# Patient Record
Sex: FEMALE | Race: ASIAN | NOT HISPANIC OR LATINO
[De-identification: names, ages, dates, MRNs, and addresses within clinical notes are randomized per-mention and may not be internally consistent; named-entity substitution may affect disease eponyms.]

---

## 2017-03-21 ENCOUNTER — APPOINTMENT (OUTPATIENT)
Dept: OPHTHALMOLOGY | Facility: CLINIC | Age: 67
End: 2017-03-21

## 2017-03-23 PROBLEM — Z00.00 ENCOUNTER FOR PREVENTIVE HEALTH EXAMINATION: Status: ACTIVE | Noted: 2017-03-23

## 2017-04-27 ENCOUNTER — APPOINTMENT (OUTPATIENT)
Dept: OPHTHALMOLOGY | Facility: CLINIC | Age: 67
End: 2017-04-27

## 2017-05-10 ENCOUNTER — RX RENEWAL (OUTPATIENT)
Age: 67
End: 2017-05-10

## 2017-05-12 ENCOUNTER — EMERGENCY (EMERGENCY)
Facility: HOSPITAL | Age: 67
LOS: 1 days | Discharge: HOME | End: 2017-05-12
Admitting: EMERGENCY MEDICINE
Payer: COMMERCIAL

## 2017-05-12 VITALS
TEMPERATURE: 98 F | SYSTOLIC BLOOD PRESSURE: 160 MMHG | RESPIRATION RATE: 16 BRPM | OXYGEN SATURATION: 99 % | DIASTOLIC BLOOD PRESSURE: 82 MMHG | HEART RATE: 79 BPM

## 2017-05-12 PROCEDURE — 73030 X-RAY EXAM OF SHOULDER: CPT | Mod: 26,LT

## 2017-05-12 PROCEDURE — 99284 EMERGENCY DEPT VISIT MOD MDM: CPT

## 2017-05-12 PROCEDURE — 73060 X-RAY EXAM OF HUMERUS: CPT | Mod: 26,LT

## 2017-05-12 PROCEDURE — 71101 X-RAY EXAM UNILAT RIBS/CHEST: CPT | Mod: 26,LT

## 2017-05-12 RX ORDER — ALBUTEROL 90 UG/1
1 AEROSOL, METERED ORAL ONCE
Qty: 0 | Refills: 0 | Status: DISCONTINUED | OUTPATIENT
Start: 2017-05-12 | End: 2017-05-16

## 2017-05-12 NOTE — ED PROVIDER NOTE - PROGRESS NOTE DETAILS
xray reviewed. No signs of pna, fracture or dislocation. Pt will be given albuterol MDI for support. Pt to follow up with ortho outpt if arm pain continues. Pt stable for discharge.

## 2017-05-12 NOTE — ED PROVIDER NOTE - MEDICAL DECISION MAKING DETAILS
66 yr old female with hx of HTN, HLD, CRF ( not on HD yet, following nephrologist Dr. Perez every 3 months) presents c/o subjective fever, chills, productive cough/ chest congestion for the last 4 days. Pt also admits to slip and fall, 4 days ago, c/o left arm/ shoulder pain with movement. mari to r/o pna vs bronchitis  xray left shoulder/ humerus: r/o fracture   pt refusing pain medications at this time.

## 2017-05-12 NOTE — ED PROVIDER NOTE - PHYSICAL EXAMINATION
spine- no bony tenderness, positive ROM   LUE: positive tenderness to mid humerus, with pain on ROM, positive radial pulses, less than 2 sec cap refill, positive 5/5 strength   Pt ambulating

## 2017-05-12 NOTE — ED PROVIDER NOTE - OBJECTIVE STATEMENT
66 yr old female with hx of HTN, HLD, CRF ( not on HD yet, following nephrologist Dr. Perez every 3 months) presents c/o subjective fever, chills, productive cough/ chest congestion for the last 4 days. Pain to left sided ribs when coughing. Pt also admits to slip and fall, 4 days ago, c/o left arm/ shoulder pain with movement. Pt admits to  having similar symptoms, and dx with bronchitis given Zithromax by pmd. Pt denies any other injury or any other symptoms. Pt denies any recent travel.

## 2017-05-18 ENCOUNTER — APPOINTMENT (OUTPATIENT)
Dept: OPHTHALMOLOGY | Facility: HOSPITAL | Age: 67
End: 2017-05-18

## 2017-05-19 ENCOUNTER — APPOINTMENT (OUTPATIENT)
Dept: OPHTHALMOLOGY | Facility: CLINIC | Age: 67
End: 2017-05-19

## 2017-05-25 ENCOUNTER — APPOINTMENT (OUTPATIENT)
Dept: OPHTHALMOLOGY | Facility: CLINIC | Age: 67
End: 2017-05-25

## 2017-06-12 ENCOUNTER — EMERGENCY (EMERGENCY)
Facility: HOSPITAL | Age: 67
LOS: 1 days | Discharge: HOME | End: 2017-06-12
Attending: EMERGENCY MEDICINE | Admitting: EMERGENCY MEDICINE
Payer: COMMERCIAL

## 2017-06-12 VITALS
TEMPERATURE: 98 F | DIASTOLIC BLOOD PRESSURE: 75 MMHG | OXYGEN SATURATION: 98 % | RESPIRATION RATE: 16 BRPM | HEART RATE: 66 BPM | SYSTOLIC BLOOD PRESSURE: 164 MMHG

## 2017-06-12 VITALS
RESPIRATION RATE: 18 BRPM | OXYGEN SATURATION: 99 % | TEMPERATURE: 98 F | DIASTOLIC BLOOD PRESSURE: 75 MMHG | HEART RATE: 59 BPM | SYSTOLIC BLOOD PRESSURE: 144 MMHG

## 2017-06-12 DIAGNOSIS — R42 DIZZINESS AND GIDDINESS: ICD-10-CM

## 2017-06-12 LAB
ALBUMIN SERPL ELPH-MCNC: 3.8 G/DL — SIGNIFICANT CHANGE UP (ref 3.3–5)
ALP SERPL-CCNC: 176 U/L — HIGH (ref 40–120)
ALT FLD-CCNC: 10 U/L — SIGNIFICANT CHANGE UP (ref 4–33)
AST SERPL-CCNC: 14 U/L — SIGNIFICANT CHANGE UP (ref 4–32)
BASOPHILS # BLD AUTO: 0.02 K/UL — SIGNIFICANT CHANGE UP (ref 0–0.2)
BASOPHILS NFR BLD AUTO: 0.3 % — SIGNIFICANT CHANGE UP (ref 0–2)
BILIRUB SERPL-MCNC: 0.2 MG/DL — SIGNIFICANT CHANGE UP (ref 0.2–1.2)
BUN SERPL-MCNC: 28 MG/DL — HIGH (ref 7–23)
CALCIUM SERPL-MCNC: 8.6 MG/DL — SIGNIFICANT CHANGE UP (ref 8.4–10.5)
CHLORIDE SERPL-SCNC: 108 MMOL/L — HIGH (ref 98–107)
CK MB BLD-MCNC: 1.14 NG/ML — SIGNIFICANT CHANGE UP (ref 1–4.7)
CK MB BLD-MCNC: SIGNIFICANT CHANGE UP (ref 0–2.5)
CK SERPL-CCNC: 49 U/L — SIGNIFICANT CHANGE UP (ref 25–170)
CO2 SERPL-SCNC: 18 MMOL/L — LOW (ref 22–31)
CREAT SERPL-MCNC: 3.3 MG/DL — HIGH (ref 0.5–1.3)
EOSINOPHIL # BLD AUTO: 0.16 K/UL — SIGNIFICANT CHANGE UP (ref 0–0.5)
EOSINOPHIL NFR BLD AUTO: 2.4 % — SIGNIFICANT CHANGE UP (ref 0–6)
GLUCOSE SERPL-MCNC: 107 MG/DL — HIGH (ref 70–99)
HCT VFR BLD CALC: 30.6 % — LOW (ref 34.5–45)
HGB BLD-MCNC: 9.4 G/DL — LOW (ref 11.5–15.5)
IMM GRANULOCYTES NFR BLD AUTO: 0.3 % — SIGNIFICANT CHANGE UP (ref 0–1.5)
LIDOCAIN IGE QN: 92 U/L — HIGH (ref 7–60)
LYMPHOCYTES # BLD AUTO: 2.95 K/UL — SIGNIFICANT CHANGE UP (ref 1–3.3)
LYMPHOCYTES # BLD AUTO: 44.6 % — HIGH (ref 13–44)
MAGNESIUM SERPL-MCNC: 2.1 MG/DL — SIGNIFICANT CHANGE UP (ref 1.6–2.6)
MCHC RBC-ENTMCNC: 26.3 PG — LOW (ref 27–34)
MCHC RBC-ENTMCNC: 30.7 % — LOW (ref 32–36)
MCV RBC AUTO: 85.5 FL — SIGNIFICANT CHANGE UP (ref 80–100)
MONOCYTES # BLD AUTO: 0.53 K/UL — SIGNIFICANT CHANGE UP (ref 0–0.9)
MONOCYTES NFR BLD AUTO: 8 % — SIGNIFICANT CHANGE UP (ref 2–14)
NEUTROPHILS # BLD AUTO: 2.93 K/UL — SIGNIFICANT CHANGE UP (ref 1.8–7.4)
NEUTROPHILS NFR BLD AUTO: 44.4 % — SIGNIFICANT CHANGE UP (ref 43–77)
PHOSPHATE SERPL-MCNC: 3.7 MG/DL — SIGNIFICANT CHANGE UP (ref 2.5–4.5)
PLATELET # BLD AUTO: 245 K/UL — SIGNIFICANT CHANGE UP (ref 150–400)
PMV BLD: 10.1 FL — SIGNIFICANT CHANGE UP (ref 7–13)
POTASSIUM SERPL-MCNC: 4 MMOL/L — SIGNIFICANT CHANGE UP (ref 3.5–5.3)
POTASSIUM SERPL-SCNC: 4 MMOL/L — SIGNIFICANT CHANGE UP (ref 3.5–5.3)
PROT SERPL-MCNC: 7.2 G/DL — SIGNIFICANT CHANGE UP (ref 6–8.3)
RBC # BLD: 3.58 M/UL — LOW (ref 3.8–5.2)
RBC # FLD: 13.2 % — SIGNIFICANT CHANGE UP (ref 10.3–14.5)
SODIUM SERPL-SCNC: 142 MMOL/L — SIGNIFICANT CHANGE UP (ref 135–145)
TROPONIN T SERPL-MCNC: < 0.06 NG/ML — SIGNIFICANT CHANGE UP (ref 0–0.06)
WBC # BLD: 6.61 K/UL — SIGNIFICANT CHANGE UP (ref 3.8–10.5)
WBC # FLD AUTO: 6.61 K/UL — SIGNIFICANT CHANGE UP (ref 3.8–10.5)

## 2017-06-12 PROCEDURE — 71020: CPT | Mod: 26

## 2017-06-12 PROCEDURE — 70450 CT HEAD/BRAIN W/O DYE: CPT | Mod: 26

## 2017-06-12 PROCEDURE — 99285 EMERGENCY DEPT VISIT HI MDM: CPT | Mod: 25

## 2017-06-12 RX ORDER — METOCLOPRAMIDE HCL 10 MG
10 TABLET ORAL ONCE
Qty: 0 | Refills: 0 | Status: COMPLETED | OUTPATIENT
Start: 2017-06-12 | End: 2017-06-12

## 2017-06-12 RX ORDER — ATENOLOL 25 MG/1
25 TABLET ORAL ONCE
Qty: 0 | Refills: 0 | Status: COMPLETED | OUTPATIENT
Start: 2017-06-12 | End: 2017-06-12

## 2017-06-12 RX ORDER — ACETAMINOPHEN 500 MG
650 TABLET ORAL ONCE
Qty: 0 | Refills: 0 | Status: COMPLETED | OUTPATIENT
Start: 2017-06-12 | End: 2017-06-12

## 2017-06-12 RX ORDER — MECLIZINE HCL 12.5 MG
25 TABLET ORAL ONCE
Qty: 0 | Refills: 0 | Status: COMPLETED | OUTPATIENT
Start: 2017-06-12 | End: 2017-06-12

## 2017-06-12 RX ORDER — SODIUM CHLORIDE 9 MG/ML
500 INJECTION INTRAMUSCULAR; INTRAVENOUS; SUBCUTANEOUS ONCE
Qty: 0 | Refills: 0 | Status: COMPLETED | OUTPATIENT
Start: 2017-06-12 | End: 2017-06-12

## 2017-06-12 RX ADMIN — Medication 10 MILLIGRAM(S): at 04:28

## 2017-06-12 RX ADMIN — Medication 25 MILLIGRAM(S): at 06:13

## 2017-06-12 RX ADMIN — SODIUM CHLORIDE 500 MILLILITER(S): 9 INJECTION INTRAMUSCULAR; INTRAVENOUS; SUBCUTANEOUS at 04:28

## 2017-06-12 RX ADMIN — ATENOLOL 25 MILLIGRAM(S): 25 TABLET ORAL at 04:38

## 2017-06-12 RX ADMIN — Medication 650 MILLIGRAM(S): at 04:38

## 2017-06-12 NOTE — ED PROVIDER NOTE - MEDICAL DECISION MAKING DETAILS
no focal cerebellar signs but still has complaint of dizziness; will get CT head and have neurology evaluate pt;

## 2017-06-12 NOTE — CONSULT NOTE ADULT - SUBJECTIVE AND OBJECTIVE BOX
Ms. Quintana is a 65YO F with chronic renal failure, HLD, and newly diagnosed HTN that is not controlled yet (on Atenolol) who presents with dizziness, N/V since waking up at 2am to use the bathroom. Her symptoms were so severe she fell onto the bed, no LOC or trauma, but called family for help. No other associated symptoms at that time except HA, 4-5/10. Pt notes the had elevated BP today to 190s SBP. Denies any visual changes, Ms. Quintana is a 67YO F with chronic renal failure, HLD, and newly diagnosed HTN that is not controlled yet (on Atenolol) who presents with dizziness, N/V since waking up at 2am to use the bathroom. Her symptoms were so severe she fell onto the bed, no LOC or trauma, but called family for help. Dizziness described as room spinning, exacerbated by standing and head movement, relieved with laying still. No other associated symptoms at that time (no focal numbness/tingling/ weakness, no CP/SOB/palpitations) except HA, 4-5/10. Pt notes the had elevated BP today to 190s SBP. Denies any visual changes, no diplopia/blurriness, no focal weakness/sensory deficit. Denies recent ear fullness/tinnitus, or URI symptoms.           PMH: chronic renal failure, HTN, HLD   PSH: none  Meds: atenolol, lipitor, amlodipine  SH: no tobacco, alcohol, illicit drug use.   NKDA                               9.4    6.61  )-----------( 245      ( 12 Jun 2017 03:50 )             30.6   06-12    142  |  108<H>  |  28<H>  ----------------------------<  107<H>  4.0   |  18<L>  |  3.30<H>    Ca    8.6      12 Jun 2017 03:50  Phos  3.7     06-12  Mg     2.1     06-12    TPro  7.2  /  Alb  3.8  /  TBili  0.2  /  DBili  x   /  AST  14  /  ALT  10  /  AlkPhos  176<H>  06-12      CT head: IMPRESSION:  No acute intracranial hemorrhage or mass effect.  Microvascular disease.

## 2017-06-12 NOTE — ED PROVIDER NOTE - OBJECTIVE STATEMENT
66yF hx CKD, newly diagnosed HTN (rx atenolol but has not started taking yet), hld p/w sudden onset nausea, vomiting, and dizziness 2 hrs ago, constant since. Symptoms associated with headache that was gradual onset, 4-5/10 severity, bilateral across head, not worst HA of life. Pt was in usual state of health today. Awoke at 2am to go to [facility] and en route back from [facility] developed symptoms suddenly, fell onto bed without loc or head trauma and called to dtr for help. No cp or sob. No fever, cough, diarrhea, or recent illness. Pt noted to have elevated bp today (190 systolic). Has never had similar symptoms in past. Denies other focal motor or sensory deficits.

## 2017-06-12 NOTE — ED ADULT TRIAGE NOTE - CHIEF COMPLAINT QUOTE
Pt brought in by [facility] c/o dizziness, nausea and vomiting. Pt was found to be hypertensive by /70. was diagnosed  a week ago with hypertension- reports she has began taking prescribed b/p meds yet.

## 2017-06-12 NOTE — CONSULT NOTE ADULT - PROBLEM SELECTOR RECOMMENDATION 9
[] symptomatic tx - IVF, Meclizine, Zofran   [] close BP management, should follow up with primary or cards   [] can f/u with neuro- [phone] for further workup and vestibular rehab if symptoms persist despite BP control

## 2017-06-12 NOTE — ED PROVIDER NOTE - NEUROLOGICAL, MLM
Alert and oriented, no focal deficits, no motor or sensory deficits. no nystagmus, normal finger to nose

## 2017-06-12 NOTE — CONSULT NOTE ADULT - ASSESSMENT
67YO F with ESRD, HTN presenting with vertiginous like symptoms in the setting of high/uncontrolled BP that appears to be peripheral in nature as exam is normal and symptoms are resolving. DDX includes BPPV vs HTN urgency.

## 2017-06-12 NOTE — ED ADULT NURSE NOTE - CHIEF COMPLAINT QUOTE
Pt brought in by [facility] c/o dizziness, nausea and vomiting. Pt was found to be hypertensive by /70. was diagnosed  a week ago with hypertension- reports she has began taking prescribed b/p meds yet.

## 2017-06-12 NOTE — ED PROVIDER NOTE - CONSTITUTIONAL, MLM
normal... Well appearing, well nourished, awake, alert, oriented to person, place, time/situation, in distress

## 2017-06-12 NOTE — ED PROVIDER NOTE - ATTENDING CONTRIBUTION TO CARE
agree with resident note  66yF hx CKD, newly diagnosed HTN who present with N/V/dizziness since waking up at approx 1 AM.  Denies focal weakness, numbness.  Denies CP/SOB.  At rest unclear if dizzy (using daughter as )    PE: uncomfortable but not toxic appearing; CTAB/L; s1 s2 no m/r/g abd soft/NT/ND ext: no edema Neuro: CNs intact 5/5 motor UE and LE; sensation intact Cerebellar: FTN wnl; no drift; REGGIE wnl

## 2017-06-16 ENCOUNTER — INPATIENT (INPATIENT)
Facility: HOSPITAL | Age: 67
LOS: 1 days | Discharge: HOME | End: 2017-06-18
Attending: HOSPITALIST | Admitting: HOSPITALIST
Payer: COMMERCIAL

## 2017-06-16 VITALS
RESPIRATION RATE: 15 BRPM | WEIGHT: 164.91 LBS | OXYGEN SATURATION: 100 % | SYSTOLIC BLOOD PRESSURE: 141 MMHG | HEART RATE: 65 BPM | TEMPERATURE: 98 F | DIASTOLIC BLOOD PRESSURE: 74 MMHG

## 2017-06-16 DIAGNOSIS — E78.5 HYPERLIPIDEMIA, UNSPECIFIED: ICD-10-CM

## 2017-06-16 DIAGNOSIS — I10 ESSENTIAL (PRIMARY) HYPERTENSION: ICD-10-CM

## 2017-06-16 DIAGNOSIS — N18.5 CHRONIC KIDNEY DISEASE, STAGE 5: ICD-10-CM

## 2017-06-16 DIAGNOSIS — R42 DIZZINESS AND GIDDINESS: ICD-10-CM

## 2017-06-16 DIAGNOSIS — Z29.9 ENCOUNTER FOR PROPHYLACTIC MEASURES, UNSPECIFIED: ICD-10-CM

## 2017-06-16 LAB
ALBUMIN SERPL ELPH-MCNC: 4.1 G/DL — SIGNIFICANT CHANGE UP (ref 3.3–5)
ALP SERPL-CCNC: 191 U/L — HIGH (ref 40–120)
ALT FLD-CCNC: 11 U/L — SIGNIFICANT CHANGE UP (ref 4–33)
AST SERPL-CCNC: 14 U/L — SIGNIFICANT CHANGE UP (ref 4–32)
BASOPHILS # BLD AUTO: 0.01 K/UL — SIGNIFICANT CHANGE UP (ref 0–0.2)
BASOPHILS NFR BLD AUTO: 0.2 % — SIGNIFICANT CHANGE UP (ref 0–2)
BILIRUB SERPL-MCNC: 0.3 MG/DL — SIGNIFICANT CHANGE UP (ref 0.2–1.2)
BUN SERPL-MCNC: 29 MG/DL — HIGH (ref 7–23)
CALCIUM SERPL-MCNC: 8.8 MG/DL — SIGNIFICANT CHANGE UP (ref 8.4–10.5)
CHLORIDE SERPL-SCNC: 106 MMOL/L — SIGNIFICANT CHANGE UP (ref 98–107)
CK MB BLD-MCNC: 1.21 NG/ML — SIGNIFICANT CHANGE UP (ref 1–4.7)
CK MB BLD-MCNC: 1.22 NG/ML — SIGNIFICANT CHANGE UP (ref 1–4.7)
CK MB BLD-MCNC: SIGNIFICANT CHANGE UP (ref 0–2.5)
CK MB BLD-MCNC: SIGNIFICANT CHANGE UP (ref 0–2.5)
CK SERPL-CCNC: 64 U/L — SIGNIFICANT CHANGE UP (ref 25–170)
CK SERPL-CCNC: 79 U/L — SIGNIFICANT CHANGE UP (ref 25–170)
CO2 SERPL-SCNC: 19 MMOL/L — LOW (ref 22–31)
CREAT SERPL-MCNC: 3.44 MG/DL — HIGH (ref 0.5–1.3)
EOSINOPHIL # BLD AUTO: 0.17 K/UL — SIGNIFICANT CHANGE UP (ref 0–0.5)
EOSINOPHIL NFR BLD AUTO: 2.8 % — SIGNIFICANT CHANGE UP (ref 0–6)
GLUCOSE SERPL-MCNC: 100 MG/DL — HIGH (ref 70–99)
HCT VFR BLD CALC: 32.8 % — LOW (ref 34.5–45)
HGB BLD-MCNC: 9.9 G/DL — LOW (ref 11.5–15.5)
IMM GRANULOCYTES NFR BLD AUTO: 0.2 % — SIGNIFICANT CHANGE UP (ref 0–1.5)
LYMPHOCYTES # BLD AUTO: 2.48 K/UL — SIGNIFICANT CHANGE UP (ref 1–3.3)
LYMPHOCYTES # BLD AUTO: 41.2 % — SIGNIFICANT CHANGE UP (ref 13–44)
MAGNESIUM SERPL-MCNC: 2.2 MG/DL — SIGNIFICANT CHANGE UP (ref 1.6–2.6)
MCHC RBC-ENTMCNC: 26.1 PG — LOW (ref 27–34)
MCHC RBC-ENTMCNC: 30.2 % — LOW (ref 32–36)
MCV RBC AUTO: 86.5 FL — SIGNIFICANT CHANGE UP (ref 80–100)
MONOCYTES # BLD AUTO: 0.5 K/UL — SIGNIFICANT CHANGE UP (ref 0–0.9)
MONOCYTES NFR BLD AUTO: 8.3 % — SIGNIFICANT CHANGE UP (ref 2–14)
NEUTROPHILS # BLD AUTO: 2.85 K/UL — SIGNIFICANT CHANGE UP (ref 1.8–7.4)
NEUTROPHILS NFR BLD AUTO: 47.3 % — SIGNIFICANT CHANGE UP (ref 43–77)
PLATELET # BLD AUTO: 265 K/UL — SIGNIFICANT CHANGE UP (ref 150–400)
PMV BLD: 10.5 FL — SIGNIFICANT CHANGE UP (ref 7–13)
POTASSIUM SERPL-MCNC: 4.8 MMOL/L — SIGNIFICANT CHANGE UP (ref 3.5–5.3)
POTASSIUM SERPL-SCNC: 4.8 MMOL/L — SIGNIFICANT CHANGE UP (ref 3.5–5.3)
PROT SERPL-MCNC: 7.9 G/DL — SIGNIFICANT CHANGE UP (ref 6–8.3)
RBC # BLD: 3.79 M/UL — LOW (ref 3.8–5.2)
RBC # FLD: 13.5 % — SIGNIFICANT CHANGE UP (ref 10.3–14.5)
SODIUM SERPL-SCNC: 139 MMOL/L — SIGNIFICANT CHANGE UP (ref 135–145)
TROPONIN T SERPL-MCNC: < 0.06 NG/ML — SIGNIFICANT CHANGE UP (ref 0–0.06)
TROPONIN T SERPL-MCNC: < 0.06 NG/ML — SIGNIFICANT CHANGE UP (ref 0–0.06)
WBC # BLD: 6.02 K/UL — SIGNIFICANT CHANGE UP (ref 3.8–10.5)
WBC # FLD AUTO: 6.02 K/UL — SIGNIFICANT CHANGE UP (ref 3.8–10.5)

## 2017-06-16 PROCEDURE — 99223 1ST HOSP IP/OBS HIGH 75: CPT

## 2017-06-16 PROCEDURE — 93010 ELECTROCARDIOGRAM REPORT: CPT

## 2017-06-16 RX ORDER — AMLODIPINE BESYLATE 2.5 MG/1
5 TABLET ORAL DAILY
Qty: 0 | Refills: 0 | Status: DISCONTINUED | OUTPATIENT
Start: 2017-06-16 | End: 2017-06-17

## 2017-06-16 RX ORDER — ATENOLOL 25 MG/1
1 TABLET ORAL
Qty: 0 | Refills: 0 | COMMUNITY

## 2017-06-16 RX ORDER — MECLIZINE HCL 12.5 MG
25 TABLET ORAL THREE TIMES A DAY
Qty: 0 | Refills: 0 | Status: DISCONTINUED | OUTPATIENT
Start: 2017-06-16 | End: 2017-06-18

## 2017-06-16 RX ORDER — ATORVASTATIN CALCIUM 80 MG/1
40 TABLET, FILM COATED ORAL AT BEDTIME
Qty: 0 | Refills: 0 | Status: DISCONTINUED | OUTPATIENT
Start: 2017-06-16 | End: 2017-06-18

## 2017-06-16 RX ORDER — HEPARIN SODIUM 5000 [USP'U]/ML
5000 INJECTION INTRAVENOUS; SUBCUTANEOUS EVERY 12 HOURS
Qty: 0 | Refills: 0 | Status: DISCONTINUED | OUTPATIENT
Start: 2017-06-16 | End: 2017-06-18

## 2017-06-16 RX ORDER — ASPIRIN/CALCIUM CARB/MAGNESIUM 324 MG
81 TABLET ORAL DAILY
Qty: 0 | Refills: 0 | Status: DISCONTINUED | OUTPATIENT
Start: 2017-06-16 | End: 2017-06-18

## 2017-06-16 RX ORDER — SODIUM BICARBONATE 1 MEQ/ML
650 SYRINGE (ML) INTRAVENOUS THREE TIMES A DAY
Qty: 0 | Refills: 0 | Status: DISCONTINUED | OUTPATIENT
Start: 2017-06-16 | End: 2017-06-18

## 2017-06-16 RX ADMIN — ATORVASTATIN CALCIUM 40 MILLIGRAM(S): 80 TABLET, FILM COATED ORAL at 21:16

## 2017-06-16 RX ADMIN — Medication 650 MILLIGRAM(S): at 21:16

## 2017-06-16 NOTE — ED PROVIDER NOTE - NS ED MD EM SELECTION
[id number] Comprehensive

## 2017-06-16 NOTE — H&P ADULT - NEGATIVE CARDIOVASCULAR SYMPTOMS
no palpitations/no orthopnea/no peripheral edema/no chest pain/no paroxysmal nocturnal dyspnea/no dyspnea on exertion

## 2017-06-16 NOTE — H&P ADULT - ASSESSMENT
65 y/o F with PMH of CKD stage V, HLD, HTN presented with the complaint of dizziness and unsteady gait. R/o CVA

## 2017-06-16 NOTE — H&P ADULT - PROBLEM SELECTOR PLAN 3
Continue with antihypertensive medications, as no permissive HTN needed per neurology recommendation  DASH diet recommended

## 2017-06-16 NOTE — ED PROVIDER NOTE - CHPI ED SYMPTOMS NEG
no vomiting/no nausea/no loss of consciousness/no confusion/no weakness/no change in level of consciousness/no fever/no numbness/no blurred vision

## 2017-06-16 NOTE — CONSULT NOTE ADULT - SUBJECTIVE AND OBJECTIVE BOX
NEUROLOGY CONSULT     Patient is a 66y old  Female who presents with a chief complaint of dizziness     HPI: 67YO F with chronic renal failure, HLD, and newly diagnosed HTN that is not controlled yet (on Atenolol) who presents with dizziness, nausea w/ difficulty walking since 6/12, pt initially came to ED on 6/12 where she had improved and was discharged home. Pt returns today for same symptoms w/out improvement. States when she walks she is leaning towards the left and is unstable. Denies vomiting, weakness, sensory changes, visual changes.        PAST MEDICAL & SURGICAL HISTORY:  HTN (hypertension)  HLD (hyperlipidemia)  Chronic renal failure  Delivery by emergency caesarean section      Allergies    PC Pen VK (Hives)    Intolerances        MEDICATIONS  (STANDING):    MEDICATIONS  (PRN):      SOCIAL HISTORY: Denies tobacco/EtOH/drug use     FAMILY HISTORY:  Family history of diabetes mellitus (Father)      REVIEW OF SYSTEMS:  CONSTITUTIONAL:  No weight loss, fever, chills, weakness or fatigue.  HEENT:  Eyes:  No visual loss, blurred vision, double vision or yellow sclerae. Ears, Nose, Throat:  No hearing loss, sneezing, congestion, runny nose or sore throat.  SKIN:  No rash or itching.  CARDIOVASCULAR:  No chest pain, chest pressure or chest discomfort. No palpitations or edema.  RESPIRATORY:  No shortness of breath, cough or sputum.  GASTROINTESTINAL:  No anorexia, nausea, vomiting or diarrhea. No abdominal pain or blood.  GENITOURINARY:  denies incontinence/retention   NEUROLOGICAL:  No headache, dizziness, syncope, paralysis, ataxia, numbness or tingling in the extremities. No change in bowel or bladder control.  MUSCULOSKELETAL:  No muscle, back pain, joint pain or stiffness.  HEMATOLOGIC:  No anemia, bleeding or bruising.  LYMPHATICS:  No enlarged nodes. No history of splenectomy.  PSYCHIATRIC:  No history of depression or anxiety.  ENDOCRINOLOGIC:  No reports of sweating, cold or heat intolerance. No polyuria or polydipsia.      Vital Signs Last 24 Hrs  T(C): 36.7, Max: 36.7 (06-16 @ 11:59)  T(F): 98, Max: 98 (06-16 @ 11:59)  HR: 65 (65 - 65)  BP: 141/74 (141/74 - 141/74)  BP(mean): --  RR: 15 (15 - 15)  SpO2: 100% (100% - 100%)    PHYSICAL EXAM:   General appearance: No acute distress                 Mental Status: AAOx3, fluent speech, follows simple commands, able to name  Cranial Nerves: EOMI, PERRL, VFF, V1-V3 intact, facial symmetric,  tongue midline  Motor: b/l UE/LE antigravity w no drift   Sensation: Intact to LT throughout  Coordination: FTN intact b/l    LABS:                          9.9    6.02  )-----------( 265      ( 16 Jun 2017 14:40 )             32.8             IMAGING:

## 2017-06-16 NOTE — CONSULT NOTE ADULT - ASSESSMENT
65 y/o F p/w dizziness x4 days w/ unsteady gait. PE grossly unremarkable w/ no ataxia or cerebellar signs. Given pt still symptomatic w/ risk factors, will have imaging to assess for cerebellar infarct.

## 2017-06-16 NOTE — H&P ADULT - NEGATIVE OPHTHALMOLOGIC SYMPTOMS
no discharge L/no photophobia/no diplopia/no lacrimation R/no lacrimation L/no blurred vision L/no blurred vision R/no discharge R

## 2017-06-16 NOTE — H&P ADULT - RS GEN PE MLT RESP DETAILS PC
respirations non-labored/no intercostal retractions/no wheezes/no rhonchi/airway patent/no rales/good air movement/no chest wall tenderness/breath sounds equal/normal/clear to auscultation bilaterally

## 2017-06-16 NOTE — H&P ADULT - HISTORY OF PRESENT ILLNESS
*HPI and ROS was obtained from patient's daughter who was at bedside and assisted with translation*    67 y/o F with PMH of CKD stage V, HLD, HTN presented with the complaint of dizziness and unsteady gait. As per daughter she brought her mother on June 12 for similar complaints and had a CT head that time which was negative. Patient's symptoms resolved and patient was discharged but symptoms recurred again the next day but with worser intensity. As per daughter her mother is unable to ambulate without holding onto objects around her. The dizziness is worse when she would sit up from a lying position. Daughter stated that her mother feels like she herself is spinning and not the room. Daughter also endorsed occipital headache with the dizziness. Patient denied any CP, SOB, fevers, chills, N/V/D/C, abdominal pain, cough, melena, hematochezia, dysuria, recent travel, sick contact, pleuritic or positional chest pain.     On ED admission EKG revealed BUN/Cr: 29/3.44, Alk Phos: 191. 6/16 Neuro: MRI brain, MRA Head w/out contrast, MRA Neck w/ contrast, ASA 81, Lipitor 80 mg qhs, TTE, telemetry, no permissive HTN given pt w/ symptoms >48h, BP goal: normotension. 6/16 Medicine: Check MRI brain, MRA head/neck per neurology recommendations, start Meclizine 25mg TID standing for today, Add anti-emetics prn if nausea/vomiting, Check lipid panel and A1C in AM, C/w home meds: Amlodipine 5mg and Sodium Bicarbonate 650mg TID with meals, Increase Lipitor to 40mg qdaily, Start ASA 81mg qdaily, Monitor on telemetry, Trend LFT's daily, PT/OT consult, DVT ppx- heparin sc. When examined patient is resting in the stretcher and denied any current complaints.

## 2017-06-16 NOTE — H&P ADULT - ATTENDING COMMENTS
Patient seen and examined. In brief, this is a 65 yo woman with a hx of HTN, HLD, and CKD stage 5 (baseline ~3.3), with recent presentation to [facility] ED earlier this week with similar complaints, presents again with worsening dizziness. Given description of dizziness, association with position, and acute onset, suspect vertiginous origin, likely peripheral. On admission, pt hemodynamically stable and afebrile. Exam reveals a middle age woman in NAD with dizziness upon rolling over and changes in head position. Neurological exam is otherwise grossly normal with no focal neurological deficits. Labs show normocytic anemia likely 2/2 to anemia of chronic nephropathy, elevated BUN/Cr consistent with hx of CKD, low bicarbonate likely due to uremic acidosis, and elevated alk phos. Initial imaging with CT head, personally reviewed, is negative for stroke or structural lesion. Neurology has been consulted for evaluation of dizziness.     Problem List:  1. Dizziness, likely 2/2 to peripheral vertigo   2. Rule out central vertigo   3. CKD stage 5  4. Uremic acidosis  5. HTN  6. HLD  7. Elevated Alkaline Phosphatase     Plan:  1. Check MRI brain, MRA head/neck per neurology recommendations  2. Start Meclizine 25mg TID standing for today   3. Add anti-emetics prn if nausea/vomiting  4. Check lipid panel and A1C in AM  5. C/w home meds: Amlodipine 5mg and Sodium Bicarbonate 650mg TID with meals  6. Increase Lipitor to 40mg qdaily   7. Start ASA 81mg qdaily  8. Monitor on telemetry  9. PT/OT consult  10. DVT ppx- heparin sc     Discussed with kathi SCHREIBER. Patient seen and examined. In brief, this is a 65 yo woman with a hx of HTN, HLD, and CKD stage 5 (baseline ~3.3), with recent presentation to [facility] ED earlier this week with similar complaints, presents again with worsening dizziness. Given description of dizziness, association with position, and acute onset, suspect vertiginous origin, likely peripheral. On admission, pt hemodynamically stable and afebrile. Exam reveals a middle age woman in NAD with dizziness upon rolling over and changes in head position. Neurological exam is otherwise grossly normal with no focal neurological deficits. Labs show normocytic anemia likely 2/2 to anemia of chronic nephropathy, elevated BUN/Cr consistent with hx of CKD, low bicarbonate likely due to uremic acidosis, and elevated alk phos. Initial imaging with CT head, personally reviewed, is negative for stroke or structural lesion. Neurology has been consulted for evaluation of dizziness.     Problem List:  1. Dizziness, likely 2/2 to peripheral vertigo   2. Rule out central vertigo, rule out stroke   3. CKD stage 5  4. Uremic acidosis  5. HTN  6. HLD  7. Elevated Alkaline Phosphatase     Plan:  1. Check MRI brain, MRA head/neck per neurology recommendations  2. Start Meclizine 25mg TID standing for today   3. Add anti-emetics prn if nausea/vomiting  4. Check lipid panel and A1C in AM  5. C/w home meds: Amlodipine 5mg and Sodium Bicarbonate 650mg TID with meals  6. Increase Lipitor to 40mg qdaily   7. Start ASA 81mg qdaily  8. Monitor on telemetry  9. PT/OT consult  10. DVT ppx- heparin sc     Discussed with tele PA. Patient seen and examined. In brief, this is a 67 yo woman with a hx of HTN, HLD, and CKD stage 5 (baseline ~3.3), with recent presentation to [facility] ED earlier this week with similar complaints, presents again with worsening dizziness. Given description of dizziness, association with position, and acute onset, suspect vertiginous origin, likely peripheral. On admission, pt hemodynamically stable and afebrile. Exam reveals a middle age woman in NAD with dizziness upon rolling over and changes in head position. Neurological exam is otherwise grossly normal with no focal neurological deficits. Labs show normocytic anemia likely 2/2 to anemia of chronic nephropathy, elevated BUN/Cr consistent with hx of CKD, low bicarbonate likely due to uremic acidosis, and elevated alk phos. Initial imaging with CT head, personally reviewed, is negative for stroke or structural lesion. Neurology has been consulted for evaluation of dizziness.     Problem List:  1. Dizziness, likely 2/2 to peripheral vertigo   2. Rule out central vertigo, rule out stroke   3. CKD stage 5  4. Uremic acidosis  5. HTN  6. HLD  7. Elevated Alkaline Phosphatase     Plan:  1. Check MRI brain, MRA head/neck per neurology recommendations  2. Start Meclizine 25mg TID standing for today   3. Add anti-emetics prn if nausea/vomiting  4. Check lipid panel and A1C in AM  5. C/w home meds: Amlodipine 5mg and Sodium Bicarbonate 650mg TID with meals  6. Increase Lipitor to 40mg qdaily   7. Start ASA 81mg qdaily  8. Monitor on telemetry  9. Trend LFT's daily   10. PT/OT consult  11. DVT ppx- heparin sc    Dispo- If stroke ruled out and symptomatically improved, can be discharged over weekend pending PT eval    Discussed with tele PA.

## 2017-06-16 NOTE — H&P ADULT - GASTROINTESTINAL DETAILS
nontender/no rebound tenderness/normal/no rigidity/soft/no distention/no guarding/bowel sounds normal

## 2017-06-16 NOTE — H&P ADULT - PROBLEM SELECTOR PLAN 2
Likely secondary to underline HTN   Continue with Sodium bicarbonate 650mg TID with meals  Avoid nephrotoxic medications   Consider Renal consult in am if warranted

## 2017-06-16 NOTE — H&P ADULT - PROBLEM SELECTOR PLAN 1
Likely 2/2 to peripheral vertigo but still can not rule out CVA  Admit to telemetry, serial Demetris, serial EKGs  HgbA1C, TSH, lipid profile, cbc, cmp in am   Will do normotensive BP as per neurology request   House neurology following and their recommendations appreciated   MRI head w/o, MRA head w/o and MRA neck w/o   TTE with bubble study ordered   Continue with aspirin 81mg and Lipitor 40mg QHS daily   Seizure, fall and aspiration precautions   Elevate head of the bed to 30 degree  PT/OT ordered   PMNR ordered   Speech and swallow ordered-placed on mechanical soft diet   Meclizine 25mg TID prn for dizziness

## 2017-06-16 NOTE — ED PROVIDER NOTE - OBJECTIVE STATEMENT
66F h/o CKD, HTN p/w dizziness and unsteady gait. Pt was seen on 7/12 for similar symptoms with negative CT at that time. Symptoms improved at that time and pt dc'ed but then recurred the next day. Daughter translating for pt (pt's preference) and states that pt complaining the dizziness worse then previous visit, now unable to ambulate without holding onto objects around her. Denies headaches, weaknes/numbness/tingling. No vision changes, hearing changes or ringing of ears. Reports URI about 1 month ago now completely resolved, no other recent illness. 66F h/o CKD, HTN p/w dizziness and unsteady gait. Pt was seen on 6/12 for similar symptoms with negative CT at that time. Symptoms improved at that time and pt dc'ed but then recurred the next day. Daughter translating for pt (pt's preference) and states that pt complaining the dizziness worse then previous visit, now unable to ambulate without holding onto objects around her. Denies headaches, weaknes/numbness/tingling. No vision changes, hearing changes or ringing of ears. Reports URI about 1 month ago now completely resolved, no other recent illness.

## 2017-06-16 NOTE — ED PROVIDER NOTE - ATTENDING CONTRIBUTION TO CARE
agree with resident note  66F h/o CKD, HTN who presents for second time this week for dizziness and now per daughter swaying at times when walking.  Pt states currently at rest not dizzy.  Of note on Monday daughter noted bradycardia in setting of dizziness and HR of 49 called PMD who changed her BP med from atenolol to amlodipine    PE: well appearing, VSS, CTAB/L; s1 s2 no m/r/g abd soft/NT/ND ext: no edema Neuro: CNs intact 5/5 motor UE and LE; FTN wnl    Imp: will have neuro come see pt; they would like pt to get MRI/MRA; will place in CDU

## 2017-06-16 NOTE — H&P ADULT - NSHPLABSRESULTS_GEN_ALL_CORE
LABS:                        9.9    6.02  )-----------( 265      ( 16 Jun 2017 14:40 )             32.8   06-16    139  |  106  |  29<H>  ----------------------------<  100<H>  4.8   |  19<L>  |  3.44<H>    Ca    8.8      16 Jun 2017 14:40  Mg     2.2     06-16    TPro  7.9  /  Alb  4.1  /  TBili  0.3  /  DBili  x   /  AST  14  /  ALT  11  /  AlkPhos  191<H>  06-16    IMAGING:  PERSONALLY REVIEWED    CT HEAD: No acute stroke or hemorrhage    EKG: PERSONALLY REVIEWED  NSR without any acute ST-T wave changes     DISCUSSED CASE WITH: Tele PA- Discussed need to check MRI brain per neuro recs

## 2017-06-16 NOTE — H&P ADULT - NEGATIVE MUSCULOSKELETAL SYMPTOMS
no arthralgia/no muscle weakness/no muscle cramps/no arthritis/no neck pain/no stiffness/no myalgia/no joint swelling

## 2017-06-16 NOTE — CONSULT NOTE ADULT - PROBLEM SELECTOR RECOMMENDATION 9
- MRI brain, MRA Head w/out contrast  -MRA Neck w/ contrast  -ASA 81, Lipitor 80 mg qhs  - TTE, telemetry  - no permissive HTN given pt w/ symptoms >48h   - BP goal: normotension

## 2017-06-16 NOTE — CONSULT NOTE ADULT - ATTENDING COMMENTS
Agree with resident note above. Very mild rotary nystagmus on examination is suggestive of a peripheral etiology. Gait and FNF normal.

## 2017-06-16 NOTE — ED ADULT NURSE NOTE - OBJECTIVE STATEMENT
Patient received AA&Ox3, Polish speaking with family at bedside translating, c/o dizziness and unsteady gait - pt. recently came ED for similar symptoms and states that sx are worse than previous visit. VSS on RA. Patient denies chest pain, N/V, SOB, fever, chills at this time. Patient ambulates independently. 20g PIV in place to left AC, labs drawn - will continue to monitor.

## 2017-06-16 NOTE — H&P ADULT - NEGATIVE NEUROLOGICAL SYMPTOMS
no difficulty walking/no syncope/no confusion/no weakness/no transient paralysis/no loss of sensation/no loss of consciousness/no hemiparesis/no paresthesias/no headache/no focal seizures/no generalized seizures/no tremors

## 2017-06-16 NOTE — H&P ADULT - NEGATIVE GASTROINTESTINAL SYMPTOMS
no constipation/no abdominal pain/no change in bowel habits/no melena/no vomiting/no nausea/no diarrhea/no hematochezia

## 2017-06-16 NOTE — H&P ADULT - NEGATIVE ENMT SYMPTOMS
no hearing difficulty/no tinnitus/no sinus symptoms/no ear pain/no nasal congestion/no nasal discharge

## 2017-06-16 NOTE — ED PROVIDER NOTE - MEDICAL DECISION MAKING DETAILS
66F h/o CKD, HTN recently seen for vertigo/dizziness now returning with same symptoms, though worsened - neuro called, will likely need MRI vs CTA - CDU vs admit

## 2017-06-17 DIAGNOSIS — R42 DIZZINESS AND GIDDINESS: ICD-10-CM

## 2017-06-17 DIAGNOSIS — R74.8 ABNORMAL LEVELS OF OTHER SERUM ENZYMES: ICD-10-CM

## 2017-06-17 DIAGNOSIS — I10 ESSENTIAL (PRIMARY) HYPERTENSION: ICD-10-CM

## 2017-06-17 DIAGNOSIS — N18.5 CHRONIC KIDNEY DISEASE, STAGE 5: ICD-10-CM

## 2017-06-17 LAB
ALBUMIN SERPL ELPH-MCNC: 4 G/DL — SIGNIFICANT CHANGE UP (ref 3.3–5)
ALP SERPL-CCNC: 192 U/L — HIGH (ref 40–120)
ALT FLD-CCNC: 10 U/L — SIGNIFICANT CHANGE UP (ref 4–33)
AST SERPL-CCNC: 17 U/L — SIGNIFICANT CHANGE UP (ref 4–32)
BILIRUB SERPL-MCNC: 0.3 MG/DL — SIGNIFICANT CHANGE UP (ref 0.2–1.2)
BUN SERPL-MCNC: 31 MG/DL — HIGH (ref 7–23)
CALCIUM SERPL-MCNC: 8.8 MG/DL — SIGNIFICANT CHANGE UP (ref 8.4–10.5)
CHLORIDE SERPL-SCNC: 107 MMOL/L — SIGNIFICANT CHANGE UP (ref 98–107)
CHOLEST SERPL-MCNC: 179 MG/DL — SIGNIFICANT CHANGE UP (ref 120–199)
CO2 SERPL-SCNC: 17 MMOL/L — LOW (ref 22–31)
CREAT SERPL-MCNC: 3.51 MG/DL — HIGH (ref 0.5–1.3)
GLUCOSE SERPL-MCNC: 108 MG/DL — HIGH (ref 70–99)
HBA1C BLD-MCNC: 6 % — HIGH (ref 4–5.6)
HCT VFR BLD CALC: 33.2 % — LOW (ref 34.5–45)
HDLC SERPL-MCNC: 32 MG/DL — LOW (ref 45–65)
HGB BLD-MCNC: 10.2 G/DL — LOW (ref 11.5–15.5)
LIPID PNL WITH DIRECT LDL SERPL: 116 MG/DL — SIGNIFICANT CHANGE UP
MAGNESIUM SERPL-MCNC: 2.1 MG/DL — SIGNIFICANT CHANGE UP (ref 1.6–2.6)
MCHC RBC-ENTMCNC: 26.5 PG — LOW (ref 27–34)
MCHC RBC-ENTMCNC: 30.7 % — LOW (ref 32–36)
MCV RBC AUTO: 86.2 FL — SIGNIFICANT CHANGE UP (ref 80–100)
PHOSPHATE SERPL-MCNC: 4.5 MG/DL — SIGNIFICANT CHANGE UP (ref 2.5–4.5)
PLATELET # BLD AUTO: 245 K/UL — SIGNIFICANT CHANGE UP (ref 150–400)
PMV BLD: 10.6 FL — SIGNIFICANT CHANGE UP (ref 7–13)
POTASSIUM SERPL-MCNC: 4.8 MMOL/L — SIGNIFICANT CHANGE UP (ref 3.5–5.3)
POTASSIUM SERPL-SCNC: 4.8 MMOL/L — SIGNIFICANT CHANGE UP (ref 3.5–5.3)
PROT SERPL-MCNC: 7.3 G/DL — SIGNIFICANT CHANGE UP (ref 6–8.3)
RBC # BLD: 3.85 M/UL — SIGNIFICANT CHANGE UP (ref 3.8–5.2)
RBC # FLD: 13.5 % — SIGNIFICANT CHANGE UP (ref 10.3–14.5)
SODIUM SERPL-SCNC: 142 MMOL/L — SIGNIFICANT CHANGE UP (ref 135–145)
TRIGL SERPL-MCNC: 226 MG/DL — HIGH (ref 10–149)
TSH SERPL-MCNC: 2.14 UIU/ML — SIGNIFICANT CHANGE UP (ref 0.27–4.2)
WBC # BLD: 5.84 K/UL — SIGNIFICANT CHANGE UP (ref 3.8–10.5)
WBC # FLD AUTO: 5.84 K/UL — SIGNIFICANT CHANGE UP (ref 3.8–10.5)

## 2017-06-17 PROCEDURE — 99223 1ST HOSP IP/OBS HIGH 75: CPT | Mod: GC

## 2017-06-17 PROCEDURE — 99233 SBSQ HOSP IP/OBS HIGH 50: CPT

## 2017-06-17 PROCEDURE — 70551 MRI BRAIN STEM W/O DYE: CPT | Mod: 26

## 2017-06-17 PROCEDURE — 70547 MR ANGIOGRAPHY NECK W/O DYE: CPT | Mod: 26

## 2017-06-17 RX ORDER — AMLODIPINE BESYLATE 2.5 MG/1
2.5 TABLET ORAL DAILY
Qty: 0 | Refills: 0 | Status: DISCONTINUED | OUTPATIENT
Start: 2017-06-17 | End: 2017-06-18

## 2017-06-17 RX ADMIN — ATORVASTATIN CALCIUM 40 MILLIGRAM(S): 80 TABLET, FILM COATED ORAL at 23:05

## 2017-06-17 RX ADMIN — AMLODIPINE BESYLATE 5 MILLIGRAM(S): 2.5 TABLET ORAL at 09:27

## 2017-06-17 RX ADMIN — Medication 650 MILLIGRAM(S): at 23:05

## 2017-06-17 RX ADMIN — HEPARIN SODIUM 5000 UNIT(S): 5000 INJECTION INTRAVENOUS; SUBCUTANEOUS at 06:17

## 2017-06-17 RX ADMIN — Medication 81 MILLIGRAM(S): at 12:45

## 2017-06-17 RX ADMIN — Medication 650 MILLIGRAM(S): at 12:45

## 2017-06-17 RX ADMIN — Medication 650 MILLIGRAM(S): at 06:17

## 2017-06-17 RX ADMIN — HEPARIN SODIUM 5000 UNIT(S): 5000 INJECTION INTRAVENOUS; SUBCUTANEOUS at 17:46

## 2017-06-17 NOTE — SWALLOW BEDSIDE ASSESSMENT ADULT - ORAL PHASE
Delayed oral transit time/Within functional limits Delayed oral transit time/Decreased anterior-posterior movement of the bolus Within functional limits

## 2017-06-17 NOTE — SWALLOW BEDSIDE ASSESSMENT ADULT - ASR SWALLOW ASPIRATION MONITOR
cough/position upright (90Y)/fever/throat clearing/gurgly voice/oral hygiene/change of breathing pattern/pneumonia/upper respiratory infection

## 2017-06-17 NOTE — PROGRESS NOTE ADULT - SUBJECTIVE AND OBJECTIVE BOX
Patient is a 66y old  Female who presents with a chief complaint of Dizziness (16 Jun 2017 17:51)      SUBJECTIVE / OVERNIGHT EVENTS:Pt in NAD +dizzy    MEDICATIONS  (STANDING):  aspirin enteric coated 81milliGRAM(s) Oral daily  atorvastatin 40milliGRAM(s) Oral at bedtime  sodium bicarbonate 650milliGRAM(s) Oral three times a day  heparin  Injectable 5000Unit(s) SubCutaneous every 12 hours  amLODIPine   Tablet 2.5milliGRAM(s) Oral daily    MEDICATIONS  (PRN):  meclizine 25milliGRAM(s) Oral three times a day PRN Dizziness      Vital Signs Last 24 Hrs  T(C): 36.8, Max: 36.8 (06-16 @ 20:00)  HR: 70 (60 - 74)  BP: 153/81 (121/66 - 156/78)  RR: 18 (16 - 20)  SpO2: 100% (100% - 100%)  Wt(kg): --  CAPILLARY BLOOD GLUCOSE    I&O's Summary      PHYSICAL EXAM:  GENERAL: NAD, well-developed  HEAD:  Atraumatic, Normocephalic  EYES: EOMI, PERRLA, conjunctiva and sclera clear  NECK: Supple, No JVD  CHEST/LUNG: Clear to auscultation bilaterally; No wheeze  HEART: Regular rate and rhythm; No murmurs, rubs, or gallops  ABDOMEN: Soft, Nontender, Nondistended; Bowel sounds present  EXTREMITIES:  2+ Peripheral Pulses, No clubbing, cyanosis, or edema  PSYCH: AAOx3  NEUROLOGY: non-focal  SKIN: No rashes or lesions    LABS:                        10.2   5.84  )-----------( 245      ( 17 Jun 2017 06:58 )             33.2     06-17    142  |  107  |  31<H>  ----------------------------<  108<H>  4.8   |  17<L>  |  3.51<H>    Ca    8.8      17 Jun 2017 06:58  Phos  4.5     06-17  Mg     2.1     06-17    TPro  7.3  /  Alb  4.0  /  TBili  0.3  /  DBili  x   /  AST  17  /  ALT  10  /  AlkPhos  192<H>  06-17      CARDIAC MARKERS ( 16 Jun 2017 20:56 )  x     / < 0.06 ng/mL / 79 u/L / 1.21 ng/mL / x      CARDIAC MARKERS ( 16 Jun 2017 14:40 )  x     / < 0.06 ng/mL / 64 u/L / 1.22 ng/mL / x              RADIOLOGY & ADDITIONAL TESTS:    Imaging Personally Reviewed:    Consultant(s) Notes Reviewed:      Care Discussed with Consultants/Other Providers: Patient is a 66y old  Female who presents with a chief complaint of Dizziness (16 Jun 2017 17:51)      SUBJECTIVE / OVERNIGHT EVENTS:Pt in NAD +dizzy tele NSR    MEDICATIONS  (STANDING):  aspirin enteric coated 81milliGRAM(s) Oral daily  atorvastatin 40milliGRAM(s) Oral at bedtime  sodium bicarbonate 650milliGRAM(s) Oral three times a day  heparin  Injectable 5000Unit(s) SubCutaneous every 12 hours  amLODIPine   Tablet 2.5milliGRAM(s) Oral daily    MEDICATIONS  (PRN):  meclizine 25milliGRAM(s) Oral three times a day PRN Dizziness      Vital Signs Last 24 Hrs  T(C): 36.8, Max: 36.8 (06-16 @ 20:00)  HR: 70 (60 - 74)  BP: 153/81 (121/66 - 156/78)  RR: 18 (16 - 20)  SpO2: 100% (100% - 100%)  Wt(kg): --  CAPILLARY BLOOD GLUCOSE    I&O's Summary      PHYSICAL EXAM:  GENERAL: NAD, well-developed  HEAD:  Atraumatic, Normocephalic  EYES: EOMI, PERRLA, conjunctiva and sclera clear  NECK: Supple, No JVD  CHEST/LUNG: Clear to auscultation bilaterally; No wheeze  HEART: Regular rate and rhythm; No murmurs, rubs, or gallops  ABDOMEN: Soft, Nontender, Nondistended; Bowel sounds present  EXTREMITIES:  2+ Peripheral Pulses, No clubbing, cyanosis, or edema  PSYCH: AAOx3  NEUROLOGY: non-focal  SKIN: No rashes or lesions    LABS:                        10.2   5.84  )-----------( 245      ( 17 Jun 2017 06:58 )             33.2     06-17    142  |  107  |  31<H>  ----------------------------<  108<H>  4.8   |  17<L>  |  3.51<H>    Ca    8.8      17 Jun 2017 06:58  Phos  4.5     06-17  Mg     2.1     06-17    TPro  7.3  /  Alb  4.0  /  TBili  0.3  /  DBili  x   /  AST  17  /  ALT  10  /  AlkPhos  192<H>  06-17      CARDIAC MARKERS ( 16 Jun 2017 20:56 )  x     / < 0.06 ng/mL / 79 u/L / 1.21 ng/mL / x      CARDIAC MARKERS ( 16 Jun 2017 14:40 )  x     / < 0.06 ng/mL / 64 u/L / 1.22 ng/mL / x              RADIOLOGY & ADDITIONAL TESTS:    Imaging Personally Reviewed:    Consultant(s) Notes Reviewed:      Care Discussed with Consultants/Other Providers:

## 2017-06-17 NOTE — SWALLOW BEDSIDE ASSESSMENT ADULT - SWALLOW EVAL: RECOMMENDED FEEDING/EATING TECHNIQUES
oral hygiene/allow for swallow between intakes/maintain upright posture during/after eating for 30 mins/small sips/bites/position upright (90 degrees)

## 2017-06-17 NOTE — SWALLOW BEDSIDE ASSESSMENT ADULT - SWALLOW EVAL: DIAGNOSIS
1. The patient demonstrated functional oral management of puree and thin liquid textures marked by adequate bolus collection, transfer, and posterior transport. 2. The patient demonstrated a mild oral dysphagia for regular solid textures marked by decreased mastication abilities resulting in delayed bolus collection, transfer, and posterior transport. Patient required a liquid wash to clear bolus residue from oral cavity. 3. The patient demonstrated a mild pharyngeal dysphagia for puree, regular solid, and thin liquid textures marked by delayed swallow trigger with reduced though complete hyolaryngeal elevation upon palpation with reduced swallow intensity without any evidence of airway penetration.

## 2017-06-18 ENCOUNTER — TRANSCRIPTION ENCOUNTER (OUTPATIENT)
Age: 67
End: 2017-06-18

## 2017-06-18 VITALS — SYSTOLIC BLOOD PRESSURE: 113 MMHG | HEART RATE: 64 BPM | DIASTOLIC BLOOD PRESSURE: 49 MMHG

## 2017-06-18 LAB
BASOPHILS # BLD AUTO: 0.01 K/UL — SIGNIFICANT CHANGE UP (ref 0–0.2)
BASOPHILS NFR BLD AUTO: 0.2 % — SIGNIFICANT CHANGE UP (ref 0–2)
BUN SERPL-MCNC: 28 MG/DL — HIGH (ref 7–23)
CALCIUM SERPL-MCNC: 8.9 MG/DL — SIGNIFICANT CHANGE UP (ref 8.4–10.5)
CHLORIDE SERPL-SCNC: 105 MMOL/L — SIGNIFICANT CHANGE UP (ref 98–107)
CO2 SERPL-SCNC: 19 MMOL/L — LOW (ref 22–31)
CREAT SERPL-MCNC: 3.45 MG/DL — HIGH (ref 0.5–1.3)
EOSINOPHIL # BLD AUTO: 0.15 K/UL — SIGNIFICANT CHANGE UP (ref 0–0.5)
EOSINOPHIL NFR BLD AUTO: 2.9 % — SIGNIFICANT CHANGE UP (ref 0–6)
GLUCOSE SERPL-MCNC: 105 MG/DL — HIGH (ref 70–99)
HCT VFR BLD CALC: 32 % — LOW (ref 34.5–45)
HGB BLD-MCNC: 9.7 G/DL — LOW (ref 11.5–15.5)
IMM GRANULOCYTES NFR BLD AUTO: 0.2 % — SIGNIFICANT CHANGE UP (ref 0–1.5)
LYMPHOCYTES # BLD AUTO: 2.08 K/UL — SIGNIFICANT CHANGE UP (ref 1–3.3)
LYMPHOCYTES # BLD AUTO: 40.1 % — SIGNIFICANT CHANGE UP (ref 13–44)
MAGNESIUM SERPL-MCNC: 2.1 MG/DL — SIGNIFICANT CHANGE UP (ref 1.6–2.6)
MCHC RBC-ENTMCNC: 26.1 PG — LOW (ref 27–34)
MCHC RBC-ENTMCNC: 30.3 % — LOW (ref 32–36)
MCV RBC AUTO: 86.3 FL — SIGNIFICANT CHANGE UP (ref 80–100)
MONOCYTES # BLD AUTO: 0.51 K/UL — SIGNIFICANT CHANGE UP (ref 0–0.9)
MONOCYTES NFR BLD AUTO: 9.8 % — SIGNIFICANT CHANGE UP (ref 2–14)
NEUTROPHILS # BLD AUTO: 2.43 K/UL — SIGNIFICANT CHANGE UP (ref 1.8–7.4)
NEUTROPHILS NFR BLD AUTO: 46.8 % — SIGNIFICANT CHANGE UP (ref 43–77)
PLATELET # BLD AUTO: 264 K/UL — SIGNIFICANT CHANGE UP (ref 150–400)
PMV BLD: 10.6 FL — SIGNIFICANT CHANGE UP (ref 7–13)
POTASSIUM SERPL-MCNC: 4.7 MMOL/L — SIGNIFICANT CHANGE UP (ref 3.5–5.3)
POTASSIUM SERPL-SCNC: 4.7 MMOL/L — SIGNIFICANT CHANGE UP (ref 3.5–5.3)
RBC # BLD: 3.71 M/UL — LOW (ref 3.8–5.2)
RBC # FLD: 13.5 % — SIGNIFICANT CHANGE UP (ref 10.3–14.5)
SODIUM SERPL-SCNC: 142 MMOL/L — SIGNIFICANT CHANGE UP (ref 135–145)
WBC # BLD: 5.19 K/UL — SIGNIFICANT CHANGE UP (ref 3.8–10.5)
WBC # FLD AUTO: 5.19 K/UL — SIGNIFICANT CHANGE UP (ref 3.8–10.5)

## 2017-06-18 PROCEDURE — 76770 US EXAM ABDO BACK WALL COMP: CPT | Mod: 26

## 2017-06-18 PROCEDURE — 99239 HOSP IP/OBS DSCHRG MGMT >30: CPT

## 2017-06-18 RX ORDER — MECLIZINE HCL 12.5 MG
1 TABLET ORAL
Qty: 30 | Refills: 0 | OUTPATIENT
Start: 2017-06-18 | End: 2017-06-28

## 2017-06-18 RX ADMIN — Medication 650 MILLIGRAM(S): at 12:09

## 2017-06-18 RX ADMIN — HEPARIN SODIUM 5000 UNIT(S): 5000 INJECTION INTRAVENOUS; SUBCUTANEOUS at 06:03

## 2017-06-18 RX ADMIN — Medication 81 MILLIGRAM(S): at 12:09

## 2017-06-18 RX ADMIN — AMLODIPINE BESYLATE 2.5 MILLIGRAM(S): 2.5 TABLET ORAL at 06:03

## 2017-06-18 RX ADMIN — Medication 650 MILLIGRAM(S): at 09:58

## 2017-06-18 NOTE — DISCHARGE NOTE ADULT - PLAN OF CARE
resolution of symptoms You were started on Meclizine as needed for dizziness. A stroke was ruled out by MRI. Please follow up with your PCP within one week. Follow up with your nephrologist routinely.

## 2017-06-18 NOTE — PROGRESS NOTE ADULT - PROBLEM SELECTOR PLAN 2
baaseline 3.3   -check spot urine protein:cr ratio  -urine lytes urine urea  -renal US  -outpt f/u if pt refusing rehab baaseline 3.3   -check spot urine protein:cr ratio  -urine lytes urine urea  -renal US  -d/w with daughter at bedside prior US neg baseline CR 3.3 has oupt f/u with Dr. Chris millerravin. prior US WNL 2 years ago

## 2017-06-18 NOTE — PROGRESS NOTE ADULT - SUBJECTIVE AND OBJECTIVE BOX
Patient is a 66y old  Female who presents with a chief complaint of Dizziness (16 Jun 2017 17:51)      SUBJECTIVE / OVERNIGHT EVENTS:    MEDICATIONS  (STANDING):  aspirin enteric coated 81milliGRAM(s) Oral daily  atorvastatin 40milliGRAM(s) Oral at bedtime  sodium bicarbonate 650milliGRAM(s) Oral three times a day  heparin  Injectable 5000Unit(s) SubCutaneous every 12 hours  amLODIPine   Tablet 2.5milliGRAM(s) Oral daily    MEDICATIONS  (PRN):  meclizine 25milliGRAM(s) Oral three times a day PRN Dizziness      Vital Signs Last 24 Hrs  T(C): 36.6, Max: 36.8 (06-17 @ 12:36)  HR: 65 (60 - 70)  BP: 127/70 (123/67 - 153/81)  RR: 18 (18 - 18)  SpO2: 99% (99% - 100%)  Wt(kg): --  CAPILLARY BLOOD GLUCOSE    I&O's Summary      PHYSICAL EXAM:  GENERAL: NAD, well-developed  HEAD:  Atraumatic, Normocephalic  EYES: EOMI, PERRLA, conjunctiva and sclera clear  NECK: Supple, No JVD  CHEST/LUNG: Clear to auscultation bilaterally; No wheeze  HEART: Regular rate and rhythm; No murmurs, rubs, or gallops  ABDOMEN: Soft, Nontender, Nondistended; Bowel sounds present  EXTREMITIES:  2+ Peripheral Pulses, No clubbing, cyanosis, or edema  PSYCH: AAOx3  NEUROLOGY: non-focal  SKIN: No rashes or lesions    LABS:                        9.7    5.19  )-----------( 264      ( 18 Jun 2017 07:10 )             32.0     06-18    142  |  105  |  28<H>  ----------------------------<  105<H>  4.7   |  19<L>  |  3.45<H>    Ca    8.9      18 Jun 2017 07:10  Phos  4.5     06-17  Mg     2.1     06-18    TPro  7.3  /  Alb  4.0  /  TBili  0.3  /  DBili  x   /  AST  17  /  ALT  10  /  AlkPhos  192<H>  06-17      CARDIAC MARKERS ( 16 Jun 2017 20:56 )  x     / < 0.06 ng/mL / 79 u/L / 1.21 ng/mL / x      CARDIAC MARKERS ( 16 Jun 2017 14:40 )  x     / < 0.06 ng/mL / 64 u/L / 1.22 ng/mL / x              RADIOLOGY & ADDITIONAL TESTS:    Imaging Personally Reviewed:  MRI/MRA head and Neck  1. Microvascular ischemic changes involving the periventricular and   subcortical white matter     2. Unremarkable MR angiographic evaluation of the cervical arteries and   [place].    no acute infarct    Consultant(s) Notes Reviewed:      Care Discussed with Consultants/Other Providers: Patient is a 66y old  Female who presents with a chief complaint of Dizziness (16 Jun 2017 17:51)      SUBJECTIVE / OVERNIGHT EVENTS: Dizziness improved. tele NSR     MEDICATIONS  (STANDING):  aspirin enteric coated 81milliGRAM(s) Oral daily  atorvastatin 40milliGRAM(s) Oral at bedtime  sodium bicarbonate 650milliGRAM(s) Oral three times a day  heparin  Injectable 5000Unit(s) SubCutaneous every 12 hours  amLODIPine   Tablet 2.5milliGRAM(s) Oral daily    MEDICATIONS  (PRN):  meclizine 25milliGRAM(s) Oral three times a day PRN Dizziness      Vital Signs Last 24 Hrs  T(C): 36.6, Max: 36.8 (06-17 @ 12:36)  HR: 65 (60 - 70)  BP: 127/70 (123/67 - 153/81)  RR: 18 (18 - 18)  SpO2: 99% (99% - 100%)  Wt(kg): --  CAPILLARY BLOOD GLUCOSE    I&O's Summary      PHYSICAL EXAM:  GENERAL: NAD, well-developed  HEAD:  Atraumatic, Normocephalic  EYES: EOMI, PERRLA, conjunctiva and sclera clear  NECK: Supple, No JVD  CHEST/LUNG: Clear to auscultation bilaterally; No wheeze  HEART: Regular rate and rhythm; No murmurs, rubs, or gallops  ABDOMEN: Soft, Nontender, Nondistended; Bowel sounds present  EXTREMITIES:  2+ Peripheral Pulses, No clubbing, cyanosis, or edema  PSYCH: AAOx3  NEUROLOGY: non-focal  SKIN: No rashes or lesions    LABS:                        9.7    5.19  )-----------( 264      ( 18 Jun 2017 07:10 )             32.0     06-18    142  |  105  |  28<H>  ----------------------------<  105<H>  4.7   |  19<L>  |  3.45<H>    Ca    8.9      18 Jun 2017 07:10  Phos  4.5     06-17  Mg     2.1     06-18    TPro  7.3  /  Alb  4.0  /  TBili  0.3  /  DBili  x   /  AST  17  /  ALT  10  /  AlkPhos  192<H>  06-17      CARDIAC MARKERS ( 16 Jun 2017 20:56 )  x     / < 0.06 ng/mL / 79 u/L / 1.21 ng/mL / x      CARDIAC MARKERS ( 16 Jun 2017 14:40 )  x     / < 0.06 ng/mL / 64 u/L / 1.22 ng/mL / x              RADIOLOGY & ADDITIONAL TESTS:    Imaging Personally Reviewed:  MRI/MRA head and Neck  1. Microvascular ischemic changes involving the periventricular and   subcortical white matter     2. Unremarkable MR angiographic evaluation of the cervical arteries and   [place].    no acute infarct    Consultant(s) Notes Reviewed:      Care Discussed with Consultants/Other Providers:

## 2017-06-18 NOTE — PROGRESS NOTE ADULT - ASSESSMENT
65 yo woman with a hx of HTN, HLD, and CKD stage 5 (baseline ~3.3), with recent presentation to [facility] ED earlier this week with similar complaints, presents again with worsening dizziness. CT head neg for acute CVA
65 yo woman with a hx of HTN, HLD, and CKD stage 5 (baseline ~3.3), with recent presentation to [facility] ED earlier this week with similar complaints, presents again with worsening dizziness. CT head neg for acute CVA

## 2017-06-18 NOTE — OCCUPATIONAL THERAPY INITIAL EVALUATION ADULT - PERTINENT HX OF CURRENT PROBLEM, REHAB EVAL
Pt is a 66 year old F with PMHx of CKD stage V, HLD, & HTN who presents to [facility] on 6/16/17 with c/o dizziness and unsteady gait. MRI Head w/o Contrast on 6/17/17 displays microvascular ischemic changes involving the periventricular and subcortical white matter.

## 2017-06-18 NOTE — OCCUPATIONAL THERAPY INITIAL EVALUATION ADULT - DIAGNOSIS, OT EVAL
s/p r/o CVA, Decreased functional mobility; Decreased ADL management. r/o CVA; Vertigo; Decreased functional mobility; Decreased ADL management. r/o CVA; r/o Vertigo; Mild decreased standing balance; Decreased functional mobility; Decreased ADL management.

## 2017-06-18 NOTE — PROGRESS NOTE ADULT - PROBLEM SELECTOR PLAN 4
-chronically elevated likely secondary to renal osteodystrophy monitor alk phos
-chronically elevated likely secondary to renal osteodystrophy monitor alk phos

## 2017-06-18 NOTE — OCCUPATIONAL THERAPY INITIAL EVALUATION ADULT - LEVEL OF INDEPENDENCE: SIT/SUPINE, REHAB EVAL
NT; Pt. left sitting at EOB. NAD. Call bell in reach. All lines intact & all precautions maintained. RN Liss made aware. Daughter present bedside.

## 2017-06-18 NOTE — OCCUPATIONAL THERAPY INITIAL EVALUATION ADULT - NS ASR BATHING EQUIP NEEDS
grab bar/shower chair grab bar/Patient educated on benefits and how to purchase privately./shower chair

## 2017-06-18 NOTE — PROGRESS NOTE ADULT - PROBLEM SELECTOR PLAN 1
-orthostatic neg  -MRI negative for acute CVA likely peripheral vertigo-dizziness improved  -cont ASA HgBA1c -6-prediabetic  on diet -orthostatic neg  -MRI negative for acute CVA likely peripheral vertigo-dizziness improved  -cont ASA HgBA1c -6-prediabetic  on diet  -Pt offered rehab refusing outpt PT -orthostatic neg  -MRI negative for acute CVA likely peripheral vertigo-dizziness improved  -cont ASA HgBA1c -6-prediabetic  on diet  -Pt offered rehab refusing outpt PT  -recent TTE as outpt as per daughter WNL

## 2017-06-18 NOTE — OCCUPATIONAL THERAPY INITIAL EVALUATION ADULT - LIVES WITH, PROFILE
Patient reports she lives with family in a house on the first floor with 4 steps to enter. Patient explains she has a bathtub in her bathroom. Patient reports she lives with family in an apartment within a 2-family house with 4 steps to enter. Pt explains her apartment is located on the first floor. Patient explains she has a bathtub in her bathroom.

## 2017-06-18 NOTE — OCCUPATIONAL THERAPY INITIAL EVALUATION ADULT - PLANNED THERAPY INTERVENTIONS, OT EVAL
transfer training/ADL retraining/strengthening/balance training strengthening/balance training/transfer training/ROM/ADL retraining

## 2017-06-18 NOTE — DISCHARGE NOTE ADULT - MEDICATION SUMMARY - MEDICATIONS TO TAKE
I will START or STAY ON the medications listed below when I get home from the hospital:    sodium bicarbonate 650 mg oral tablet  -- 1 tab(s) by mouth 3 times a day  -- Indication: For Chronic renal failure, stage 5    meclizine 25 mg oral tablet  -- 1 tab(s) by mouth 3 times a day, As needed, Dizziness  -- Indication: For Dizziness    Lipitor 10 mg oral tablet  -- 1 tab(s) by mouth once a day  -- Indication: For HLD (hyperlipidemia)    amLODIPine 5 mg oral tablet  -- 1 tab(s) by mouth once a day  -- Indication: For HTN (hypertension)

## 2017-06-18 NOTE — OCCUPATIONAL THERAPY INITIAL EVALUATION ADULT - GENERAL OBSERVATIONS, REHAB EVAL
Pt. received semisupine in bed. NAD. Daughter present in the room. Pt. received semisupine in bed. NAD. +Heplock. Daughter present in the room. Pt.'s primary language is Willie. This OT student and daughter were able to communicate with patient in preferred language.

## 2017-06-18 NOTE — DISCHARGE NOTE ADULT - CARE PLAN
Principal Discharge DX:	Dizziness  Goal:	resolution of symptoms  Instructions for follow-up, activity and diet:	You were started on Meclizine as needed for dizziness. A stroke was ruled out by MRI. Please follow up with your PCP within one week. Principal Discharge DX:	Dizziness  Goal:	resolution of symptoms  Instructions for follow-up, activity and diet:	You were started on Meclizine as needed for dizziness. A stroke was ruled out by MRI. Please follow up with your PCP within one week.  Secondary Diagnosis:	CKD (chronic kidney disease), stage 5  Instructions for follow-up, activity and diet:	Follow up with your nephrologist routinely.

## 2017-06-18 NOTE — OCCUPATIONAL THERAPY INITIAL EVALUATION ADULT - NS ASR TOILETING EQUIP NEEDS
3:1 commode 3:1 commode/Patient is to be educated on benefits and how to privately purchase if needed in upcoming session.

## 2017-06-18 NOTE — DISCHARGE NOTE ADULT - PATIENT PORTAL LINK FT
“You can access the [employer] Patient Portal, offered by [facility], by registering with the following website: [email or web address]”

## 2017-06-18 NOTE — OCCUPATIONAL THERAPY INITIAL EVALUATION ADULT - MD ORDER
Occupational Therapy to evaluate and treat. Occupational Therapy to evaluate and treat. Out of bed with assistance. As per RN, Liss, pt. was okay to participate in OT evaluation and perform out of bed activity.

## 2017-06-18 NOTE — OCCUPATIONAL THERAPY INITIAL EVALUATION ADULT - PATIENT/FAMILY/SIGNIFICANT OTHER GOALS STATEMENT, OT EVAL
Patient reports she wants to be able to improve performance in functional mobility and self-care tasks. Patient reports she wants to be able to improve performance in functional mobility.

## 2017-06-18 NOTE — DISCHARGE NOTE ADULT - HOSPITAL COURSE
67 y/o F with PMH of CKD stage V, HLD, HTN presented with the complaint of dizziness and unsteady gait. As per daughter she brought her mother on June 12 for similar complaints and had a CT head that time which was negative. Patient's symptoms resolved and patient was discharged but symptoms recurred again the next day but with worser intensity. As per daughter her mother is unable to ambulate without holding onto objects around her. The dizziness is worse when she would sit up from a lying position.    Meclizine PRN was started for dizziness. MRI head/neck ruled out a CVA. As per neurology very mild rotary nystagmus on examination is suggestive of a peripheral etiology. Pt is now cleared for discharge . PT recommended rehab, however pt would like to go home. 65 y/o F with PMH of CKD stage V, HLD, HTN presented with the complaint of dizziness and unsteady gait. As per daughter she brought her mother on June 12 for similar complaints and had a CT head that time which was negative. Patient's symptoms resolved and patient was discharged but symptoms recurred again the next day but with worser intensity. As per daughter her mother is unable to ambulate without holding onto objects around her. The dizziness is worse when she would sit up from a lying position.    Meclizine PRN was started for dizziness. MRI head/neck ruled out a CVA. As per neurology very mild rotary nystagmus on examination is suggestive of a peripheral etiology. Pt is now cleared for discharge . PT recommended rehab, however pt would like to go home.   kidney US done --prelim reading shows no obstruction.

## 2017-06-19 ENCOUNTER — TRANSCRIPTION ENCOUNTER (OUTPATIENT)
Age: 67
End: 2017-06-19

## 2017-06-20 ENCOUNTER — RX RENEWAL (OUTPATIENT)
Age: 67
End: 2017-06-20

## 2017-06-27 ENCOUNTER — APPOINTMENT (OUTPATIENT)
Dept: OPHTHALMOLOGY | Facility: CLINIC | Age: 67
End: 2017-06-27

## 2017-07-06 ENCOUNTER — OUTPATIENT (OUTPATIENT)
Dept: OUTPATIENT SERVICES | Facility: HOSPITAL | Age: 67
LOS: 1 days | End: 2017-07-06
Payer: COMMERCIAL

## 2017-07-06 ENCOUNTER — APPOINTMENT (OUTPATIENT)
Dept: OPHTHALMOLOGY | Facility: HOSPITAL | Age: 67
End: 2017-07-06

## 2017-07-06 VITALS
DIASTOLIC BLOOD PRESSURE: 82 MMHG | RESPIRATION RATE: 20 BRPM | HEART RATE: 64 BPM | OXYGEN SATURATION: 100 % | SYSTOLIC BLOOD PRESSURE: 151 MMHG

## 2017-07-06 VITALS
DIASTOLIC BLOOD PRESSURE: 65 MMHG | SYSTOLIC BLOOD PRESSURE: 134 MMHG | OXYGEN SATURATION: 100 % | HEART RATE: 66 BPM | RESPIRATION RATE: 16 BRPM | TEMPERATURE: 98 F

## 2017-07-06 DIAGNOSIS — H25.811 COMBINED FORMS OF AGE-RELATED CATARACT, RIGHT EYE: ICD-10-CM

## 2017-07-06 PROCEDURE — 66984 XCAPSL CTRC RMVL W/O ECP: CPT | Mod: RT

## 2017-07-06 NOTE — ASU DISCHARGE PLAN (ADULT/PEDIATRIC). - NOTIFY
Swelling that continues/Pain not relieved by Medications/Bleeding that does not stop/Persistent Nausea and Vomiting/Fever greater than 101

## 2017-07-07 ENCOUNTER — APPOINTMENT (OUTPATIENT)
Dept: OPHTHALMOLOGY | Facility: CLINIC | Age: 67
End: 2017-07-07

## 2017-07-07 ENCOUNTER — TRANSCRIPTION ENCOUNTER (OUTPATIENT)
Age: 67
End: 2017-07-07

## 2017-07-13 ENCOUNTER — APPOINTMENT (OUTPATIENT)
Dept: OPHTHALMOLOGY | Facility: CLINIC | Age: 67
End: 2017-07-13

## 2017-08-03 ENCOUNTER — APPOINTMENT (OUTPATIENT)
Dept: OPHTHALMOLOGY | Facility: CLINIC | Age: 67
End: 2017-08-03
Payer: COMMERCIAL

## 2017-08-03 PROCEDURE — 99024 POSTOP FOLLOW-UP VISIT: CPT

## 2017-08-15 ENCOUNTER — APPOINTMENT (OUTPATIENT)
Dept: OPHTHALMOLOGY | Facility: CLINIC | Age: 67
End: 2017-08-15

## 2017-09-05 ENCOUNTER — APPOINTMENT (OUTPATIENT)
Dept: OPHTHALMOLOGY | Facility: CLINIC | Age: 67
End: 2017-09-05

## 2017-09-12 ENCOUNTER — APPOINTMENT (OUTPATIENT)
Dept: OPHTHALMOLOGY | Facility: CLINIC | Age: 67
End: 2017-09-12
Payer: COMMERCIAL

## 2017-09-12 PROCEDURE — 99024 POSTOP FOLLOW-UP VISIT: CPT

## 2017-09-12 RX ORDER — PREDNISOLONE ACETATE 10 MG/ML
1 SUSPENSION/ DROPS OPHTHALMIC 4 TIMES DAILY
Qty: 10 | Refills: 2 | Status: DISCONTINUED | COMMUNITY
Start: 2017-05-10 | End: 2017-09-12

## 2017-09-12 RX ORDER — OFLOXACIN 3 MG/ML
0.3 SOLUTION/ DROPS OPHTHALMIC 4 TIMES DAILY
Qty: 5 | Refills: 2 | Status: DISCONTINUED | COMMUNITY
Start: 2017-05-10 | End: 2017-09-12

## 2018-03-13 ENCOUNTER — APPOINTMENT (OUTPATIENT)
Dept: OPHTHALMOLOGY | Facility: CLINIC | Age: 68
End: 2018-03-13
Payer: COMMERCIAL

## 2018-03-13 PROCEDURE — 76519 ECHO EXAM OF EYE: CPT | Mod: 26,LT

## 2018-03-13 PROCEDURE — 92014 COMPRE OPH EXAM EST PT 1/>: CPT

## 2018-05-10 ENCOUNTER — RX RENEWAL (OUTPATIENT)
Age: 68
End: 2018-05-10

## 2018-05-10 RX ORDER — PREDNISOLONE ACETATE 10 MG/ML
1 SUSPENSION/ DROPS OPHTHALMIC 4 TIMES DAILY
Qty: 15 | Refills: 2 | Status: ACTIVE | COMMUNITY
Start: 2018-05-10 | End: 1900-01-01

## 2018-05-10 RX ORDER — MOXIFLOXACIN OPHTHALMIC 5 MG/ML
0.5 SOLUTION/ DROPS OPHTHALMIC 4 TIMES DAILY
Qty: 4 | Refills: 1 | Status: ACTIVE | COMMUNITY
Start: 2018-05-10 | End: 1900-01-01

## 2018-05-16 ENCOUNTER — TRANSCRIPTION ENCOUNTER (OUTPATIENT)
Age: 68
End: 2018-05-16

## 2018-05-17 ENCOUNTER — OUTPATIENT (OUTPATIENT)
Dept: OUTPATIENT SERVICES | Facility: HOSPITAL | Age: 68
LOS: 1 days | End: 2018-05-17
Payer: COMMERCIAL

## 2018-05-17 ENCOUNTER — APPOINTMENT (OUTPATIENT)
Dept: OPHTHALMOLOGY | Facility: HOSPITAL | Age: 68
End: 2018-05-17

## 2018-05-17 VITALS
RESPIRATION RATE: 14 BRPM | SYSTOLIC BLOOD PRESSURE: 157 MMHG | DIASTOLIC BLOOD PRESSURE: 87 MMHG | OXYGEN SATURATION: 100 % | HEART RATE: 61 BPM

## 2018-05-17 VITALS
SYSTOLIC BLOOD PRESSURE: 153 MMHG | DIASTOLIC BLOOD PRESSURE: 75 MMHG | WEIGHT: 169.32 LBS | RESPIRATION RATE: 14 BRPM | HEART RATE: 66 BPM | HEIGHT: 65 IN | OXYGEN SATURATION: 100 % | TEMPERATURE: 98 F

## 2018-05-17 DIAGNOSIS — Z98.49 CATARACT EXTRACTION STATUS, UNSPECIFIED EYE: Chronic | ICD-10-CM

## 2018-05-17 DIAGNOSIS — H25.812 COMBINED FORMS OF AGE-RELATED CATARACT, LEFT EYE: ICD-10-CM

## 2018-05-17 PROCEDURE — 66982 XCAPSL CTRC RMVL CPLX WO ECP: CPT | Mod: LT

## 2018-05-17 PROCEDURE — V2632: CPT

## 2018-05-17 PROCEDURE — C1889: CPT

## 2018-05-18 ENCOUNTER — APPOINTMENT (OUTPATIENT)
Dept: OPHTHALMOLOGY | Facility: CLINIC | Age: 68
End: 2018-05-18
Payer: COMMERCIAL

## 2018-05-18 PROCEDURE — 99024 POSTOP FOLLOW-UP VISIT: CPT

## 2018-05-24 ENCOUNTER — APPOINTMENT (OUTPATIENT)
Dept: OPHTHALMOLOGY | Facility: CLINIC | Age: 68
End: 2018-05-24
Payer: COMMERCIAL

## 2018-05-24 PROCEDURE — 99024 POSTOP FOLLOW-UP VISIT: CPT

## 2018-06-19 ENCOUNTER — APPOINTMENT (OUTPATIENT)
Dept: OPHTHALMOLOGY | Facility: CLINIC | Age: 68
End: 2018-06-19
Payer: COMMERCIAL

## 2018-06-19 DIAGNOSIS — H26.9 UNSPECIFIED CATARACT: ICD-10-CM

## 2018-06-19 DIAGNOSIS — H25.12 AGE-RELATED NUCLEAR CATARACT, LEFT EYE: ICD-10-CM

## 2018-06-19 PROCEDURE — 99024 POSTOP FOLLOW-UP VISIT: CPT

## 2018-08-02 ENCOUNTER — INPATIENT (INPATIENT)
Facility: HOSPITAL | Age: 68
LOS: 3 days | Discharge: HOME | End: 2018-08-06
Attending: INTERNAL MEDICINE | Admitting: INTERNAL MEDICINE
Payer: COMMERCIAL

## 2018-08-02 VITALS
TEMPERATURE: 98 F | SYSTOLIC BLOOD PRESSURE: 120 MMHG | RESPIRATION RATE: 16 BRPM | DIASTOLIC BLOOD PRESSURE: 85 MMHG | HEART RATE: 84 BPM

## 2018-08-02 DIAGNOSIS — D64.9 ANEMIA, UNSPECIFIED: ICD-10-CM

## 2018-08-02 DIAGNOSIS — I48.91 UNSPECIFIED ATRIAL FIBRILLATION: ICD-10-CM

## 2018-08-02 DIAGNOSIS — N18.5 CHRONIC KIDNEY DISEASE, STAGE 5: ICD-10-CM

## 2018-08-02 DIAGNOSIS — I10 ESSENTIAL (PRIMARY) HYPERTENSION: ICD-10-CM

## 2018-08-02 DIAGNOSIS — R55 SYNCOPE AND COLLAPSE: ICD-10-CM

## 2018-08-02 DIAGNOSIS — Z98.49 CATARACT EXTRACTION STATUS, UNSPECIFIED EYE: Chronic | ICD-10-CM

## 2018-08-02 DIAGNOSIS — N18.9 CHRONIC KIDNEY DISEASE, UNSPECIFIED: ICD-10-CM

## 2018-08-02 DIAGNOSIS — E78.5 HYPERLIPIDEMIA, UNSPECIFIED: ICD-10-CM

## 2018-08-02 DIAGNOSIS — R42 DIZZINESS AND GIDDINESS: ICD-10-CM

## 2018-08-02 LAB
ALBUMIN SERPL ELPH-MCNC: 4.1 G/DL — SIGNIFICANT CHANGE UP (ref 3.3–5)
ALP SERPL-CCNC: 240 U/L — HIGH (ref 40–120)
ALT FLD-CCNC: 8 U/L — SIGNIFICANT CHANGE UP (ref 4–33)
APPEARANCE UR: CLEAR — SIGNIFICANT CHANGE UP
APTT BLD: 29 SEC — SIGNIFICANT CHANGE UP (ref 27.5–37.4)
APTT BLD: > 200 SEC — CRITICAL HIGH (ref 27.5–37.4)
AST SERPL-CCNC: 13 U/L — SIGNIFICANT CHANGE UP (ref 4–32)
BACTERIA # UR AUTO: SIGNIFICANT CHANGE UP
BASOPHILS # BLD AUTO: 0.03 K/UL — SIGNIFICANT CHANGE UP (ref 0–0.2)
BASOPHILS NFR BLD AUTO: 0.5 % — SIGNIFICANT CHANGE UP (ref 0–2)
BILIRUB SERPL-MCNC: 0.3 MG/DL — SIGNIFICANT CHANGE UP (ref 0.2–1.2)
BILIRUB UR-MCNC: NEGATIVE — SIGNIFICANT CHANGE UP
BLOOD UR QL VISUAL: NEGATIVE — SIGNIFICANT CHANGE UP
BUN SERPL-MCNC: 25 MG/DL — HIGH (ref 7–23)
CALCIUM SERPL-MCNC: 8.7 MG/DL — SIGNIFICANT CHANGE UP (ref 8.4–10.5)
CHLORIDE SERPL-SCNC: 106 MMOL/L — SIGNIFICANT CHANGE UP (ref 98–107)
CHLORIDE UR-SCNC: 53 MMOL/L — SIGNIFICANT CHANGE UP
CO2 SERPL-SCNC: 17 MMOL/L — LOW (ref 22–31)
COLOR SPEC: SIGNIFICANT CHANGE UP
CREAT SERPL-MCNC: 3.48 MG/DL — HIGH (ref 0.5–1.3)
EOSINOPHIL # BLD AUTO: 0.19 K/UL — SIGNIFICANT CHANGE UP (ref 0–0.5)
EOSINOPHIL NFR BLD AUTO: 2.9 % — SIGNIFICANT CHANGE UP (ref 0–6)
GLUCOSE SERPL-MCNC: 126 MG/DL — HIGH (ref 70–99)
GLUCOSE UR-MCNC: NEGATIVE — SIGNIFICANT CHANGE UP
HCT VFR BLD CALC: 31.9 % — LOW (ref 34.5–45)
HCT VFR BLD CALC: 35.2 % — SIGNIFICANT CHANGE UP (ref 34.5–45)
HGB BLD-MCNC: 10.5 G/DL — LOW (ref 11.5–15.5)
HGB BLD-MCNC: 9.6 G/DL — LOW (ref 11.5–15.5)
IMM GRANULOCYTES # BLD AUTO: 0.02 # — SIGNIFICANT CHANGE UP
IMM GRANULOCYTES NFR BLD AUTO: 0.3 % — SIGNIFICANT CHANGE UP (ref 0–1.5)
INR BLD: 0.91 — SIGNIFICANT CHANGE UP (ref 0.88–1.17)
KETONES UR-MCNC: NEGATIVE — SIGNIFICANT CHANGE UP
LEUKOCYTE ESTERASE UR-ACNC: NEGATIVE — SIGNIFICANT CHANGE UP
LYMPHOCYTES # BLD AUTO: 2.08 K/UL — SIGNIFICANT CHANGE UP (ref 1–3.3)
LYMPHOCYTES # BLD AUTO: 32.2 % — SIGNIFICANT CHANGE UP (ref 13–44)
MCHC RBC-ENTMCNC: 25.8 PG — LOW (ref 27–34)
MCHC RBC-ENTMCNC: 26.2 PG — LOW (ref 27–34)
MCHC RBC-ENTMCNC: 29.8 % — LOW (ref 32–36)
MCHC RBC-ENTMCNC: 30.1 % — LOW (ref 32–36)
MCV RBC AUTO: 86.5 FL — SIGNIFICANT CHANGE UP (ref 80–100)
MCV RBC AUTO: 87.2 FL — SIGNIFICANT CHANGE UP (ref 80–100)
MONOCYTES # BLD AUTO: 0.49 K/UL — SIGNIFICANT CHANGE UP (ref 0–0.9)
MONOCYTES NFR BLD AUTO: 7.6 % — SIGNIFICANT CHANGE UP (ref 2–14)
NEUTROPHILS # BLD AUTO: 3.64 K/UL — SIGNIFICANT CHANGE UP (ref 1.8–7.4)
NEUTROPHILS NFR BLD AUTO: 56.5 % — SIGNIFICANT CHANGE UP (ref 43–77)
NITRITE UR-MCNC: NEGATIVE — SIGNIFICANT CHANGE UP
NRBC # FLD: 0 — SIGNIFICANT CHANGE UP
NRBC # FLD: 0 — SIGNIFICANT CHANGE UP
PH UR: 6.5 — SIGNIFICANT CHANGE UP (ref 4.6–8)
PLATELET # BLD AUTO: 238 K/UL — SIGNIFICANT CHANGE UP (ref 150–400)
PLATELET # BLD AUTO: 270 K/UL — SIGNIFICANT CHANGE UP (ref 150–400)
PMV BLD: 10.3 FL — SIGNIFICANT CHANGE UP (ref 7–13)
PMV BLD: 10.4 FL — SIGNIFICANT CHANGE UP (ref 7–13)
POTASSIUM SERPL-MCNC: 4.4 MMOL/L — SIGNIFICANT CHANGE UP (ref 3.5–5.3)
POTASSIUM SERPL-SCNC: 4.4 MMOL/L — SIGNIFICANT CHANGE UP (ref 3.5–5.3)
POTASSIUM UR-SCNC: 23 MMOL/L — SIGNIFICANT CHANGE UP
PROT SERPL-MCNC: 8.1 G/DL — SIGNIFICANT CHANGE UP (ref 6–8.3)
PROT UR-MCNC: NEGATIVE MG/DL — SIGNIFICANT CHANGE UP
PROTHROM AB SERPL-ACNC: 10.5 SEC — SIGNIFICANT CHANGE UP (ref 9.8–13.1)
RBC # BLD: 3.66 M/UL — LOW (ref 3.8–5.2)
RBC # BLD: 4.07 M/UL — SIGNIFICANT CHANGE UP (ref 3.8–5.2)
RBC # FLD: 13.5 % — SIGNIFICANT CHANGE UP (ref 10.3–14.5)
RBC # FLD: 13.8 % — SIGNIFICANT CHANGE UP (ref 10.3–14.5)
RBC CASTS # UR COMP ASSIST: SIGNIFICANT CHANGE UP (ref 0–?)
SODIUM SERPL-SCNC: 139 MMOL/L — SIGNIFICANT CHANGE UP (ref 135–145)
SODIUM UR-SCNC: 54 MMOL/L — SIGNIFICANT CHANGE UP
SP GR SPEC: 1 — SIGNIFICANT CHANGE UP (ref 1–1.04)
SQUAMOUS # UR AUTO: SIGNIFICANT CHANGE UP
TROPONIN T, HIGH SENSITIVITY: 26 NG/L — SIGNIFICANT CHANGE UP (ref ?–14)
TROPONIN T, HIGH SENSITIVITY: 30 NG/L — SIGNIFICANT CHANGE UP (ref ?–14)
TSH SERPL-MCNC: 2.73 UIU/ML — SIGNIFICANT CHANGE UP (ref 0.27–4.2)
UROBILINOGEN FLD QL: NORMAL MG/DL — SIGNIFICANT CHANGE UP
WBC # BLD: 6.45 K/UL — SIGNIFICANT CHANGE UP (ref 3.8–10.5)
WBC # BLD: 7.8 K/UL — SIGNIFICANT CHANGE UP (ref 3.8–10.5)
WBC # FLD AUTO: 6.45 K/UL — SIGNIFICANT CHANGE UP (ref 3.8–10.5)
WBC # FLD AUTO: 7.8 K/UL — SIGNIFICANT CHANGE UP (ref 3.8–10.5)
WBC UR QL: SIGNIFICANT CHANGE UP (ref 0–?)

## 2018-08-02 PROCEDURE — 70450 CT HEAD/BRAIN W/O DYE: CPT | Mod: 26

## 2018-08-02 PROCEDURE — 71045 X-RAY EXAM CHEST 1 VIEW: CPT | Mod: 26

## 2018-08-02 PROCEDURE — 99223 1ST HOSP IP/OBS HIGH 75: CPT | Mod: GC

## 2018-08-02 RX ORDER — SODIUM CHLORIDE 9 MG/ML
1000 INJECTION INTRAMUSCULAR; INTRAVENOUS; SUBCUTANEOUS ONCE
Qty: 0 | Refills: 0 | Status: COMPLETED | OUTPATIENT
Start: 2018-08-02 | End: 2018-08-02

## 2018-08-02 RX ORDER — HEPARIN SODIUM 5000 [USP'U]/ML
3000 INJECTION INTRAVENOUS; SUBCUTANEOUS EVERY 6 HOURS
Qty: 0 | Refills: 0 | Status: DISCONTINUED | OUTPATIENT
Start: 2018-08-02 | End: 2018-08-02

## 2018-08-02 RX ORDER — DOCUSATE SODIUM 100 MG
100 CAPSULE ORAL
Qty: 0 | Refills: 0 | Status: DISCONTINUED | OUTPATIENT
Start: 2018-08-02 | End: 2018-08-06

## 2018-08-02 RX ORDER — ASPIRIN/CALCIUM CARB/MAGNESIUM 324 MG
324 TABLET ORAL DAILY
Qty: 0 | Refills: 0 | Status: DISCONTINUED | OUTPATIENT
Start: 2018-08-02 | End: 2018-08-02

## 2018-08-02 RX ORDER — SODIUM BICARBONATE 1 MEQ/ML
650 SYRINGE (ML) INTRAVENOUS THREE TIMES A DAY
Qty: 0 | Refills: 0 | Status: DISCONTINUED | OUTPATIENT
Start: 2018-08-02 | End: 2018-08-06

## 2018-08-02 RX ORDER — ENOXAPARIN SODIUM 100 MG/ML
80 INJECTION SUBCUTANEOUS ONCE
Qty: 0 | Refills: 0 | Status: DISCONTINUED | OUTPATIENT
Start: 2018-08-02 | End: 2018-08-02

## 2018-08-02 RX ORDER — ONDANSETRON 8 MG/1
4 TABLET, FILM COATED ORAL ONCE
Qty: 0 | Refills: 0 | Status: COMPLETED | OUTPATIENT
Start: 2018-08-02 | End: 2018-08-02

## 2018-08-02 RX ORDER — HEPARIN SODIUM 5000 [USP'U]/ML
1100 INJECTION INTRAVENOUS; SUBCUTANEOUS
Qty: 25000 | Refills: 0 | Status: DISCONTINUED | OUTPATIENT
Start: 2018-08-02 | End: 2018-08-03

## 2018-08-02 RX ORDER — HEPARIN SODIUM 5000 [USP'U]/ML
INJECTION INTRAVENOUS; SUBCUTANEOUS
Qty: 25000 | Refills: 0 | Status: DISCONTINUED | OUTPATIENT
Start: 2018-08-02 | End: 2018-08-02

## 2018-08-02 RX ORDER — ATORVASTATIN CALCIUM 80 MG/1
10 TABLET, FILM COATED ORAL AT BEDTIME
Qty: 0 | Refills: 0 | Status: DISCONTINUED | OUTPATIENT
Start: 2018-08-02 | End: 2018-08-06

## 2018-08-02 RX ORDER — HEPARIN SODIUM 5000 [USP'U]/ML
6000 INJECTION INTRAVENOUS; SUBCUTANEOUS ONCE
Qty: 0 | Refills: 0 | Status: COMPLETED | OUTPATIENT
Start: 2018-08-02 | End: 2018-08-02

## 2018-08-02 RX ORDER — PANTOPRAZOLE SODIUM 20 MG/1
40 TABLET, DELAYED RELEASE ORAL
Qty: 0 | Refills: 0 | Status: DISCONTINUED | OUTPATIENT
Start: 2018-08-02 | End: 2018-08-06

## 2018-08-02 RX ORDER — HEPARIN SODIUM 5000 [USP'U]/ML
6000 INJECTION INTRAVENOUS; SUBCUTANEOUS EVERY 6 HOURS
Qty: 0 | Refills: 0 | Status: DISCONTINUED | OUTPATIENT
Start: 2018-08-02 | End: 2018-08-02

## 2018-08-02 RX ORDER — HEPARIN SODIUM 5000 [USP'U]/ML
6000 INJECTION INTRAVENOUS; SUBCUTANEOUS EVERY 6 HOURS
Qty: 0 | Refills: 0 | Status: DISCONTINUED | OUTPATIENT
Start: 2018-08-02 | End: 2018-08-03

## 2018-08-02 RX ORDER — HEPARIN SODIUM 5000 [USP'U]/ML
3000 INJECTION INTRAVENOUS; SUBCUTANEOUS EVERY 6 HOURS
Qty: 0 | Refills: 0 | Status: DISCONTINUED | OUTPATIENT
Start: 2018-08-02 | End: 2018-08-03

## 2018-08-02 RX ADMIN — ATORVASTATIN CALCIUM 10 MILLIGRAM(S): 80 TABLET, FILM COATED ORAL at 21:21

## 2018-08-02 RX ADMIN — Medication 650 MILLIGRAM(S): at 14:08

## 2018-08-02 RX ADMIN — ONDANSETRON 4 MILLIGRAM(S): 8 TABLET, FILM COATED ORAL at 06:17

## 2018-08-02 RX ADMIN — SODIUM CHLORIDE 1000 MILLILITER(S): 9 INJECTION INTRAMUSCULAR; INTRAVENOUS; SUBCUTANEOUS at 06:17

## 2018-08-02 RX ADMIN — HEPARIN SODIUM 1300 UNIT(S)/HR: 5000 INJECTION INTRAVENOUS; SUBCUTANEOUS at 14:12

## 2018-08-02 RX ADMIN — HEPARIN SODIUM 1100 UNIT(S)/HR: 5000 INJECTION INTRAVENOUS; SUBCUTANEOUS at 22:37

## 2018-08-02 RX ADMIN — HEPARIN SODIUM 0 UNIT(S)/HR: 5000 INJECTION INTRAVENOUS; SUBCUTANEOUS at 21:21

## 2018-08-02 RX ADMIN — Medication 650 MILLIGRAM(S): at 21:21

## 2018-08-02 RX ADMIN — Medication 324 MILLIGRAM(S): at 06:06

## 2018-08-02 RX ADMIN — HEPARIN SODIUM 6000 UNIT(S): 5000 INJECTION INTRAVENOUS; SUBCUTANEOUS at 14:08

## 2018-08-02 RX ADMIN — SODIUM CHLORIDE 1000 MILLILITER(S): 9 INJECTION INTRAMUSCULAR; INTRAVENOUS; SUBCUTANEOUS at 08:30

## 2018-08-02 NOTE — H&P ADULT - GASTROINTESTINAL DETAILS
nontender/no distention/no rigidity/no masses palpable/no bruit/no guarding/soft/bowel sounds normal/no rebound tenderness/no organomegaly/normal

## 2018-08-02 NOTE — ED ADULT NURSE NOTE - CHIEF COMPLAINT QUOTE
Pt Willie speaking declines translation As per Daughter" Last night before going to sleep she c/p palpitations she went to bed but then got up 4am to brush teeth and got sweaty and nauseaus and felt dizzy like she was going to pass out, sat her down and it got better but she still feels weak." Denies cp. no facial droop as per daughter her speech is clear,  MuE=strength no drift, hand grsp strong =,  Lower ext + = strngth on passive strngth assessment. ALPESH Velarde to triage for Stroke Eval.

## 2018-08-02 NOTE — H&P ADULT - RS GEN PE MLT RESP DETAILS PC
no wheezes/no chest wall tenderness/no rhonchi/no subcutaneous emphysema/airway patent/clear to auscultation bilaterally/breath sounds equal/good air movement/respirations non-labored/no intercostal retractions/no rales

## 2018-08-02 NOTE — ED PROVIDER NOTE - OBJECTIVE STATEMENT
67F hx of ESRD no HD, HLD presents with a cc of dizziness last night per daughter reports prior to bed pt experiencing chest palpitations with HR 's; 67F hx of ESRD no HD, HLD presents with a cc of dizziness last night per daughter reports prior to bed pt experiencing chest palpitations with HR 's; pt then went to sleep woke in middle of night went to bathroom had episode where became lightheaded and dizzy, pale, clammy and sweating, a/w SOB, lost balance, did not fall, no head trauma no LOC recalls entire event - prompting ED visit - since episode no recurrence of palpitations. No prior hx of afib. + nausea, no vomiting. Denies /v/f/c. Denies headache, . Denies abdominal pain. Denies dysuria, hematuria, hematochezia, BRBPR, tarry stools, diarrhea, constipation.

## 2018-08-02 NOTE — H&P ADULT - PROBLEM SELECTOR PLAN 2
Lifestyle modification. Low NA, low fats, low carbs. Continue Lipitor check Echo orthostatics, monitor heart rate

## 2018-08-02 NOTE — CONSULT NOTE ADULT - SUBJECTIVE AND OBJECTIVE BOX
HPI:  Patient is a 67 year old female with PMHx of CKD (not on HD), HTN who presented to ED from with dizziness and palpitations which occurred early this morning. She developed intermittent palpitations yesterday evening also. She woke up this morning, walked to the bathroom to brush her teeth and c/p dizziness, diaphoresis and cool extremities, SOB. Her symptoms lasted approx 15 minutes, no LOC or syncope. her HR in the ED was in 80s-120s, not tachycardic now, but vomited twice, and was found to have new onset afib. She has baseline fatigue, no history of stroke or MI. Currently not dizzy, no headache, no change in vision (she was able to walk to the bathroom in the ED with no issues), denies chest pain, SOB. At baseline she is AOx3, and able to complete ADLs.   Neurology was consulted for initiation of heparin, due to finding of chronic small infarct in PICA.     MEDICATIONS  (STANDING):  atorvastatin 10 milliGRAM(s) Oral at bedtime  docusate sodium 100 milliGRAM(s) Oral two times a day  heparin  Infusion.  Unit(s)/Hr (13 mL/Hr) IV Continuous <Continuous>  pantoprazole    Tablet 40 milliGRAM(s) Oral before breakfast  sodium bicarbonate 650 milliGRAM(s) Oral three times a day    MEDICATIONS  (PRN):  heparin  Injectable 6000 Unit(s) IV Push every 6 hours PRN For aPTT less than 40  heparin  Injectable 3000 Unit(s) IV Push every 6 hours PRN For aPTT between 40 - 57    PAST MEDICAL & SURGICAL HISTORY:  Anemia  HTN (hypertension)  HLD (hyperlipidemia)  Chronic renal failure  Status post cataract extraction: Left eye September 2017  Status post cataract extraction: right eye done 5/2017  Delivery by emergency caesarean section    FAMILY HISTORY:  Family history of hypertension (Sibling)  Family history of acute myocardial infarction (Sibling)  Family history of diabetes mellitus (Father, Sibling)    Allergies  PC Pen VK (Hives)  SHx - No smoking, No ETOH, No drug abuse    Review of Systems:  CONSTITUTIONAL:  No weight loss, fever, chills, weakness or fatigue.  HEENT:  Eyes:  No visual loss, blurred vision, double vision or yellow sclerae. Ears, Nose, Throat:  No hearing loss, sneezing, congestion, runny nose or sore throat.  CARDIOVASCULAR:  No chest pain, chest pressure or chest discomfort. No palpitations or edema.  GASTROINTESTINAL:  No anorexia, nausea, vomiting or diarrhea. No abdominal pain or blood.  NEUROLOGICAL: See HPI  MUSCULOSKELETAL:  No muscle, back pain, joint pain or stiffness.  PSYCHIATRIC:  No history of depression or anxiety.    Vital Signs Last 24 Hrs  T(C): 37.1 (02 Aug 2018 13:41), Max: 37.1 (02 Aug 2018 08:30)  T(F): 98.8 (02 Aug 2018 13:41), Max: 98.8 (02 Aug 2018 08:30)  HR: 66 (02 Aug 2018 13:41) (58 - 84)  BP: 100/74 (02 Aug 2018 13:41) (100/74 - 134/81)  BP(mean): --  RR: 18 (02 Aug 2018 13:41) (16 - 18)  SpO2: 100% (02 Aug 2018 13:41) (100% - 100%)    General Exam:   General appearance: No acute distress                 Neurological Exam:  Mental Status: Orientated to self, date and place.    No dysarthria, aphasia or neglect.      Cranial Nerves: CN I - not tested.  PERRL, EOMI, VFF, no nystagmus or diplopia.  No APD.    Fundi not visualized bilaterally.    No facial asymmetry.  Hearing intact to finger rub bilaterally.     Motor:   Tone: normal.                  Strength:     [] Upper extremity                      Delt       Bicep    Tricep                                                  R         5/5        5/5        5/5       5/5                                               L          5/5        5/5        5/5       5/5  [] Lower extremity                       HF          KE          KF        DF         PF                                               R        5/5        5/5        5/5       5/5       5/5                                               L         5/5        5/5       5/5       5/5        5/5  Pronator drift: none                 Dysmetria: BL NL FTN  No truncal ataxia.    Tremor: No resting, postural or action tremor.  No myoclonus.    Sensation: intact to light touch    Toes flexor bilaterally  Gait: normal and stable.      Other:  Radiology    CT head: Microvascular disease. Interval small chronic appearing right PICA territory infarction. No acute intracranial hemorrhage. HPI:  Patient is a 67 year old  [place] female with PMHx of CKD (not on HD), HTN who presented to ED from with dizziness and palpitations which occurred early this morning. She developed intermittent palpitations yesterday evening also. She woke up this morning, walked to the bathroom to brush her teeth and c/p dizziness, diaphoresis and cool extremities, SOB. Her symptoms lasted approx 15 minutes, no LOC or syncope. her HR in the ED was in 80s-120s, not tachycardic now, but vomited twice, and was found to have new onset afib. She has baseline fatigue, no history of stroke or MI. Currently not dizzy, no headache, no change in vision (she was able to walk to the bathroom in the ED with no issues), denies chest pain, SOB. At baseline she is AOx3, and able to complete ADLs.   Neurology was consulted for initiation of heparin, due to finding of chronic small infarct in PICA.     NIHSS 0, preMRS 0    MEDICATIONS  (STANDING):  atorvastatin 10 milliGRAM(s) Oral at bedtime  docusate sodium 100 milliGRAM(s) Oral two times a day  heparin  Infusion.  Unit(s)/Hr (13 mL/Hr) IV Continuous <Continuous>  pantoprazole    Tablet 40 milliGRAM(s) Oral before breakfast  sodium bicarbonate 650 milliGRAM(s) Oral three times a day    MEDICATIONS  (PRN):  heparin  Injectable 6000 Unit(s) IV Push every 6 hours PRN For aPTT less than 40  heparin  Injectable 3000 Unit(s) IV Push every 6 hours PRN For aPTT between 40 - 57    PAST MEDICAL & SURGICAL HISTORY:  Anemia  HTN (hypertension)  HLD (hyperlipidemia)  Chronic renal failure  Status post cataract extraction: Left eye September 2017  Status post cataract extraction: right eye done 5/2017  Delivery by emergency caesarean section    FAMILY HISTORY:  Family history of hypertension (Sibling)  Family history of acute myocardial infarction (Sibling)  Family history of diabetes mellitus (Father, Sibling)    Allergies  PC Pen VK (Hives)  SHx - No smoking, No ETOH, No drug abuse    Review of Systems:  CONSTITUTIONAL:  No weight loss, fever, chills, weakness or fatigue.  HEENT:  Eyes:  No visual loss, blurred vision, double vision or yellow sclerae. Ears, Nose, Throat:  No hearing loss, sneezing, congestion, runny nose or sore throat.  CARDIOVASCULAR:  No chest pain, chest pressure or chest discomfort. No palpitations or edema.  GASTROINTESTINAL:  No anorexia, nausea, vomiting or diarrhea. No abdominal pain or blood.  NEUROLOGICAL: See HPI  MUSCULOSKELETAL:  No muscle, back pain, joint pain or stiffness.  PSYCHIATRIC:  No history of depression or anxiety.    Vital Signs Last 24 Hrs  T(C): 37.1 (02 Aug 2018 13:41), Max: 37.1 (02 Aug 2018 08:30)  T(F): 98.8 (02 Aug 2018 13:41), Max: 98.8 (02 Aug 2018 08:30)  HR: 66 (02 Aug 2018 13:41) (58 - 84)  BP: 100/74 (02 Aug 2018 13:41) (100/74 - 134/81)  BP(mean): --  RR: 18 (02 Aug 2018 13:41) (16 - 18)  SpO2: 100% (02 Aug 2018 13:41) (100% - 100%)    General Exam:   General appearance: No acute distress                 Neurological Exam:  Mental Status: Orientated to self, date and place.    No dysarthria, aphasia or neglect.      Cranial Nerves: CN I - not tested.  PERRL, EOMI, VFF, no nystagmus or diplopia.  No APD.    Fundi not visualized bilaterally.    No facial asymmetry.  Hearing intact to finger rub bilaterally.     Motor:   Tone: normal.                  Strength:     [] Upper extremity                      Delt       Bicep    Tricep                                                  R         5/5        5/5        5/5       5/5                                               L          5/5        5/5        5/5       5/5  [] Lower extremity                       HF          KE          KF        DF         PF                                               R        5/5        5/5        5/5       5/5       5/5                                               L         5/5        5/5       5/5       5/5        5/5  Pronator drift: none                 Dysmetria: BL NL FTN  No truncal ataxia.    Tremor: No resting, postural or action tremor.  No myoclonus.    Sensation: intact to light touch    Toes flexor bilaterally  Gait: normal and stable.      Other:  Radiology    CT head: Microvascular disease. Interval small chronic appearing right PICA territory infarction. No acute intracranial hemorrhage.

## 2018-08-02 NOTE — CONSULT NOTE ADULT - ASSESSMENT
67 year old female patient w/PMHx significant for CKD stage 5, not on HD, and HTN with sCr on admission of 3.48. Family reports last sCr in May was 3.63. Previous admission in 2017 with reported sCr of 3.45 at discharge. Renal US 2017 showed diffusely increased echogenicity associated with chronic kidney disease.

## 2018-08-02 NOTE — CONSULT NOTE ADULT - ASSESSMENT
Patient is a 67 year old female with PMHx of CKD (not on HD), HTN who presented to ED from with dizziness and palpitations which occurred early this morning. She developed intermittent palpitations yesterday evening also. She woke up this morning, walked to the bathroom to brush her teeth and c/p dizziness, diaphoresis and cool extremities, SOB. Her symptoms lasted approx 15 minutes, no LOC or syncope. her HR in the ED was in 80s-120s, not tachycardic now, but vomited twice, and was found to have new onset afib. She has baseline fatigue, no history of stroke or MI. Currently not dizzy, no headache, no change in vision (she was able to walk to the bathroom in the ED with no issues), denies chest pain, SOB. At baseline she is AOx3, and able to complete ADLs  Neurology was consulted for initiation of heparin, due to finding of chronic small infarct in PICA.   Neurologic examination unremarkable, as above.     Plan  [] based on history and physical exam, no contraindication in initiation of heparin for tx of Afib, risks outweigh the benefits. discussed with family at bedside. no further imaging necessary  [] rest of treatment per primary team    Thank you for the consult Patient is a 67 year old   female female with PMHx of CKD (not on HD), HTN who presented to ED from with dizziness and palpitations which occurred early this morning. She developed intermittent palpitations yesterday evening also. She woke up this morning, walked to the bathroom to brush her teeth and c/p dizziness, diaphoresis and cool extremities, SOB. Her symptoms lasted approx 15 minutes, no LOC or syncope. her HR in the ED was in 80s-120s, not tachycardic now, but vomited twice, and was found to have new onset afib. She has baseline fatigue, no history of stroke or MI. Currently not dizzy, no headache, no change in vision (she was able to walk to the bathroom in the ED with no issues), denies chest pain, SOB. At baseline she is AOx3, and able to complete ADLs  Neurology was consulted for initiation of heparin, due to finding of chronic small infarct in PICA.   Neurologic examination unremarkable, as above.    NIHSS 0 preMRS 0  Plan  [] based on history and physical exam, no contraindication in initiation of heparin for tx of Afib, risks outweigh the benefits. discussed with family at bedside. no further imaging necessary  [] rest of treatment per primary team    Thank you for the consult Patient is a 67 year old [facility] female female with PMHx of CKD (not on HD), HTN who presented to ED from with dizziness and palpitations which occurred early this morning. She developed intermittent palpitations yesterday evening also. She woke up this morning, walked to the bathroom to brush her teeth and c/p dizziness, diaphoresis and cool extremities, SOB. Her symptoms lasted approx 15 minutes, no LOC or syncope. her HR in the ED was in 80s-120s, not tachycardic now, but vomited twice, and was found to have new onset afib. She has baseline fatigue, no history of stroke or MI. Currently not dizzy, no headache, no change in vision (she was able to walk to the bathroom in the ED with no issues), denies chest pain, SOB. At baseline she is AOx3, and able to complete ADLs  Neurology was consulted for initiation of heparin, due to finding of chronic small infarct in PICA.   Neurologic examination unremarkable, as above.    NIHSS 0 preMRS 0  Plan  [] based on history and physical exam, no contraindication in initiation of heparin for tx of Afib, risks outweigh the benefits. discussed with family at bedside. no further imaging necessary  [] rest of treatment per primary team    Thank you for the consult    Attending attestation:     I have seen and examined this patient with the stroke neurology team.     History was reviewed with the patient and/or available family members.   ROS: All negative except documented in the HPI.   Neurological exam was performed and agree with exam as documented above.   Laboratory results and imaging studies were reviewed by me.   I agree with the neurology resident note as documented above.    67 years old, right-handed woman with multiple vascular risk factors, including age, HTN, CKD and newly diagnosed atrial fibrillation is evaluated at [facility] for "abnormal CT brain". On 8/2, she presented to the hospital with an episode of presyncope and was newly diagnosed to have atrial fibrillation. She denies any history of focal neurological symptoms concerning for stroke or TIA in the past. CT brain on admission showed age indeterminate (likely chronic to my eye) small punctate left PICA distribution infarct. Neurological examination today is nonfocal.    Impression:  Cerebral thrombosis with cerebral infarction. Old left PICA distribution "asymptomatic" infarct - likely etiology being small vessel disease but possibility of large vessel disease needs to be ruled out  Newly diagnosed atrial fibrillation with [id number] score>1    Plan:  - Therapeutic anticoagulation ([id number] score>1) for secondary stroke prevention. She is currently on therapeutic anticoagulation with heparin drip. Would prefer to transition her to oral anticoagulant; preferably with DOACs like apixaban for therapeutic anticoagulation if deemed to have non-valvular atrial fibrillation. Risks versus benefits, and adverse reactions less complications associated with therapeutic anticoagulation with apixaban, including paradoxically increased risk of stroke or MI upon abrupt discontinuation of the medication were discussed with her and available family member at bedside in detail  - No indications to add antiplatelet therapy to therapeutic anticoagulation from seconds to prevention standpoint unless patient has history of CAD or cardiac stents  - LDL 90, continue with home statin medication if applicable   - HbA1C 5.8, continue with aggressive vascular risk factors modifications   - BP goal - gradual normotension   - Awaiting MRI brain without contrast, MRA head and neck to evaluate for intracranial and extracranial cerebral vasculature - which could also be performed as outpatient  - Carotid duplex to evaluate for extracranial cerebral vasculature  - TTE to rule out any structural cardiac source of embolism like intracardiac thrombus, as well as to evaluate for possible valvular heart disease  - Evaluation and management of atrial fibrillation as per primary team  - PT/OT/Speech and swallow/safety eval  - Stroke education    Above mentioned plan was discussed with patient and available family member in detail. All the questions were answered and concerns were addressed.

## 2018-08-02 NOTE — H&P ADULT - FAMILY HISTORY
Father  Still living? Unknown  Family history of diabetes mellitus, Age at diagnosis: Age Unknown     Sibling  Still living? Unknown  Family history of diabetes mellitus, Age at diagnosis: Age Unknown  Family history of acute myocardial infarction, Age at diagnosis: Age Unknown  Family history of hypertension, Age at diagnosis: Age Unknown

## 2018-08-02 NOTE — H&P ADULT - HISTORY OF PRESENT ILLNESS
67-year-old female with ESRD (past 8 years, no HD), HLD (>10 years) and borderline HTN presenting due to dizziness and palpitations occurring overnight at 4am. Pt.'s daughter states that she awoke to use the bathroom and complained of dizziness within 5 minutes of arousal and states the mother was diaphoretic with cool extremities, SOB, near syncope, bilateral shoulder and upper back pain and replied with only yes/no answers. Pt. assisted back to bed and her symptoms lasted approximately 15 minutes. Upon arrival heart rate range 's, now 60's, emesis twice since arrival (food, no blood). Pt. admits to fatigue and weakness (past 3 years), SOB on exertion (baseline: 1-2 blocks, worsened with symptoms), swelling in b/l LE (since onset of kidney disease) and cyanosis in LE (past several years). Pt. denies previous episodes, chest pain, orthopnea, PND, LOC, cough or wheeze. 67-year-old female with CKD (past 8 years, no HD), HLD (>10 years) and borderline HTN presenting with dizziness and palpitations occurring overnight. Pt developed intermittent palpitations from 6pm to 10pm and went to sleep by 11pm. Pt.'s daughter states that her mother awoke to use the bathroom and complained of dizziness within 5 minutes of arousal and states she was diaphoretic with cool extremities, also SOB, bilateral shoulder and upper back pain and replied with only yes/no answers. Family assisted pt back to bed and her symptoms lasted approximately 15 minutes. Upon arrival to ED,  heart rate range 's, now 60's, vomited twice since arrival (food, no blood). Pt. admits to fatigue and weakness (past 3 years), SOB on exertion (baseline: 1-2 blocks, worsened with symptoms), swelling in b/l LE (since onset of kidney disease) and cyanosis in LE (past several years). Pt. denies previous episodes of palp's & dizziness, chest pain, orthopnea, PND, LOC, cough or wheeze. 67-year-old female with CKD (past 8 years, no HD), HLD (>10 years) and borderline HTN presenting with dizziness and palpitations occurring overnight. Pt developed intermittent palpitations from 6pm to 10pm and went to sleep by 11pm. Pt.'s daughter states that her mother awoke went to the bathroom to brush her teeth and complained of dizziness within 5 minutes and states she was diaphoretic with cool extremities, also SOB, bilateral shoulder and upper back pain and replied with only yes/no answers. Family assisted pt back to bed and her symptoms lasted approximately 15 minutes. Upon arrival to ED,  heart rate range 's, now 60's, vomited twice since arrival (food, no blood). Pt. admits to fatigue and weakness (past 3 years), SOB on exertion (baseline: 1-2 blocks, worsened with symptoms), swelling in b/l LE (since onset of kidney disease) and cyanosis in LE (past several years). Pt. denies previous episodes of palp's & dizziness, chest pain, orthopnea, PND, LOC, cough or wheeze.

## 2018-08-02 NOTE — ED ADULT NURSE NOTE - OBJECTIVE STATEMENT
kassi rn-pt received spot 29, alert and orientedx3, daughter at bedside for translation. c.o dizziness last night with palpitations. arrives in afib. denies chest pain. c.o nausea. denies vomiting. afib on cm. respirations even unlabored. 20G iv placed left ac. labs sent. rpt given to NATY mehta.

## 2018-08-02 NOTE — H&P ADULT - CARDIOVASCULAR SYMPTOMS
dyspnea on exertion/peripheral edema/palpitations dyspnea on exertion/peripheral edema/palpitations/See HPI, has had peripheral edema for past several years

## 2018-08-02 NOTE — CONSULT NOTE ADULT - PROBLEM SELECTOR RECOMMENDATION 9
Low concern for fluid overload based on physical exam and vital signs. Today sCr 3.48 reassuring for no TRES at this time.  Please avoid nephrotoxins, renally dose medications, and monitor CMP Low concern for fluid overload based on physical exam and vital signs. Today sCr 3.48, close to her last discharge in 2017 at 3.45, reassuring for no TRES at this time.  Please obtain iron studies, PTH and vitamin D 25 levels. Please obtain spot urine protein/Cr ratio.   Please avoid nephrotoxins, renally dose medications, and monitor CMP. If discharged on Eliquis, please renally dose. Low concern for fluid overload based on physical exam and vital signs. Today sCr 3.48, close to her last discharge in 2017 at 3.45, reassuring for no TRES at this time.  For baseline studies: please obtain iron panel, PTH and vitamin D 25 levels. Please obtain spot urine protein/Cr ratio.   Please avoid nephrotoxins, renally dose medications, and monitor CMP. If discharged on Eliquis, please renally dose.

## 2018-08-02 NOTE — H&P ADULT - NSHPOUTPATIENTPROVIDERS_GEN_ALL_CORE
PCP - Dr. Garcia, Nephrologist - Ronald Perez, Cardiologist - Shaik Helen PCP - Dr. Garcia, Nephrologist - Ronald Perez, Cardiologist - Sheik Helen

## 2018-08-02 NOTE — ED ADULT TRIAGE NOTE - CHIEF COMPLAINT QUOTE
Pt Willie speaking declines translation As per Daughter" Last night before going to sleep she c/p palpitations she went to bed but then got up 4am to brush teeth and got sweaty and nauseaus and felt dizzy like she was going to pass out, sat her down and it got better but she still feels weak." Denies cp. no facial droop as per daughter her speech is clear,  MuE=strength no drift, hand grsp strong =,  Lower ext + = strngth on passive strngth assessment. ALPESH Velarde to triage for Stroke Eval. Pt Willie speaking declines translation As per Daughter" Last night before going to sleep she complained of  palpitations ....she went to bed but then got up 4am to brush teeth and got sweaty and nauseaus and felt dizzy like she was going to pass out, sat her down and it got better but she still feels weak." Denies cp. no facial droop as per daughter her speech is clear,  MuE=strength no drift, hand grsp strong =,  Lower ext + = strngth on passive strngth assessment. ALPESH Velarde to triage for Stroke Eval.

## 2018-08-02 NOTE — H&P ADULT - NEGATIVE GENERAL GENITOURINARY SYMPTOMS
no urinary hesitancy/no flank pain L/no incontinence/no renal colic/normal urinary frequency/no dysuria/no flank pain R

## 2018-08-02 NOTE — ED ADULT NURSE NOTE - NSIMPLEMENTINTERV_GEN_ALL_ED
Implemented All Fall with Harm Risk Interventions:  [place] to call system. Call bell, personal items and telephone within reach. Instruct patient to call for assistance. Room bathroom lighting operational. Non-slip footwear when patient is off stretcher. Physically safe environment: no spills, clutter or unnecessary equipment. Stretcher in lowest position, wheels locked, appropriate side rails in place. Provide visual cue, wrist band, yellow gown, etc. Monitor gait and stability. Monitor for mental status changes and reorient to person, place, and time. Review medications for side effects contributing to fall risk. Reinforce activity limits and safety measures with patient and family. Provide visual clues: red socks.

## 2018-08-02 NOTE — H&P ADULT - GASTROINTESTINAL COMMENTS
Daughter states mother had emesis x2 since arrival to ED. Has had GERD for past 5 years Daughter states mother had vomited x2 since arrival to ED. Has had GERD for past 5 years

## 2018-08-02 NOTE — H&P ADULT - NEGATIVE OPHTHALMOLOGIC SYMPTOMS
no blurred vision R/no discharge R/no scleral injection L/no scleral injection R/no lacrimation L/no discharge L/no pain L/no irritation R/no blurred vision L/no photophobia/no lacrimation R/no irritation L/no loss of vision L/no loss of vision R/no diplopia

## 2018-08-02 NOTE — H&P ADULT - NEGATIVE GENERAL SYMPTOMS
no anorexia/no polyuria/no malaise/no fever/no chills/no weight loss/no weight gain/no polyphagia/no polydipsia/no fatigue

## 2018-08-02 NOTE — H&P ADULT - MUSCULOSKELETAL
detailed exam details… ROM intact/no joint swelling/no joint erythema/no calf tenderness/normal strength

## 2018-08-02 NOTE — ED PROVIDER NOTE - PROGRESS NOTE DETAILS
HR 70s, pt no longer dizziness. Will provide PO dilt and admit to hospital. Syd ALPESH: Called to [facility] for stroke eval. As per family pt went to sleep at 11PM, woke up at 4AM with dizziness and lightheadedness. PE: CNII-XII intact, motor and sensory intact. CODE STROKE not called as patient's last known normal is outside the tPA window. German MERCEDES: tele doc Dr Sutton recommending lovenox for afib. Pt's and family refusing lovenox at this time, concerned about pt's renal fxn. Explained to pt that lovenox dosing for afib for GFR <30 is still 1mg/kg but family deferring lovenox at this time until they are able to be seen by tele doc.

## 2018-08-02 NOTE — H&P ADULT - RESPIRATORY AND THORAX COMMENTS
See HPI, patient has some SOB at rest, exacerbated during episode See HPI, patient has some SOB at rest, exacerbated during episode last night

## 2018-08-02 NOTE — H&P ADULT - PROBLEM SELECTOR PLAN 1
Lifestyle modification to maintain prehypertensive status. Low NA, low fats, low carbs. Admit to telemetry unit. Seriel BP monitoring, serial CE's, Heparin drip once cleared by Head CT, check echo. F/U Cardiology note

## 2018-08-02 NOTE — H&P ADULT - NEGATIVE NEUROLOGICAL SYMPTOMS
no syncope/no tremors/no loss of sensation/no focal seizures/no paresthesias/no generalized seizures/no hemiparesis/Resolved vertigo in 2017/no facial palsy/no difficulty walking/no loss of consciousness/no confusion/no transient paralysis

## 2018-08-02 NOTE — ED PROVIDER NOTE - PHYSICAL EXAMINATION
GEN APPEARANCE: WDWN, alert and cooperative, non-toxic appearing and in NAD  HEAD: Atraumatic, normocephalic   EYES: PERRLa, EOMI, vision grossly intact.   EARS: Gross hearing intact.   NOSE: No nasal discharge, no external evidence of epistaxis.   THROAT: MMM. Oral cavity and pharynx normal. No inflammation, no swelling, no exudate, no oral lesions.  NECK: Supple  CV: irregular/irregular S1S2, no c/r/m/g. No cyanosis or pallor. Extremities warm, well perfused. Cap refill <2 seconds. No bruits.   LUNGS: CTAB. No wheezing. No rales. No rhonchi. No diminished breath sounds.   ABDOMEN: Soft, NTND. No guarding or rebound. No masses.   MSK: Spine appears normal, no spine point tenderness. No CVA ttp. No joint erythema or tenderness. Normal muscular development. Pelvis stable.  EXTREMITIES: No peripheral edema. No obvious joint or bony deformity.  NEURO: Alert, follows commands. Weight bearing normal. Speech normal. Sensation and motor normal x4 extremities.   SKIN: Normal color for race, warm, dry and intact. No evidence of rash.  PSYCH: Normal mood and affect.

## 2018-08-02 NOTE — H&P ADULT - PROBLEM SELECTOR PLAN 6
Admit to tele unit. Seriel BP monitoring, serial CE's, Heparin drip, CXR, echo Lifestyle modification. Low NA, low fats, low carbs. Continue Lipitor

## 2018-08-02 NOTE — H&P ADULT - NEGATIVE GASTROINTESTINAL SYMPTOMS
no flatulence/no abdominal pain/no steatorrhea/no change in bowel habits/no melena/no hematochezia/no jaundice/no hiccoughs/no constipation/no diarrhea

## 2018-08-02 NOTE — H&P ADULT - PSH
Delivery by emergency caesarean section    Status post cataract extraction  right eye done 5/2017  Status post cataract extraction  Left eye September 2017

## 2018-08-02 NOTE — H&P ADULT - NEUROLOGICAL SYMPTOMS
Occassional headaches attributed to amlodipine. Some weakness for past few years/weakness/headache weakness/Occasional headaches attributed to amlodipine. Some weakness for past few years/headache

## 2018-08-02 NOTE — CONSULT NOTE ADULT - SUBJECTIVE AND OBJECTIVE BOX
[facility] DIVISION OF KIDNEY DISEASES AND HYPERTENSION -- INITIAL CONSULT NOTE  --------------------------------------------------------------------------------  HPI:  67 year old female patient w/PMHx significant for CKD stage 5 followed for 8 years, not on HD, and HTN on treatment for the past year, presents with palpitations, diaphoresis, and light-headedness this morning after awakening, found to have new onset of Afib. Nephrology consulted for past hx of CKD of unknown etiology. Patient speaks Willie and Rohan, her daughter provided translation at bedside. She follows with Dr. Ronald Perez for nephrology at [facility]. She has had CKD for 8 years, only being monitored, with unclear etiology as the patient has only been treated for HTN this past year and has not had any history of diabetes. She declined doing a kidney biopsy, and Dr. Perez had recommended possible kidney transplant, which the family is in the stages of considering. Their last appointment with Dr. Perez was more than 6 months ago. She last had her Cr checked in May, which the daughter reports was 3.63, and that every 6 months it has been increasing by about 0.1 or 0.2. The patient eats a diet with salt regularly. She has been using her medication regularly. The daughter reports the patient has had chronic fatigue which she attributes to the anemia or kidney disease.     PAST HISTORY  --------------------------------------------------------------------------------  PAST MEDICAL & SURGICAL HISTORY:  Anemia  HTN (hypertension)  HLD (hyperlipidemia)  Chronic renal failure  Status post cataract extraction: Left eye September 2017  Status post cataract extraction: right eye done 5/2017  Delivery by emergency caesarean section    FAMILY HISTORY:  Family history of hypertension (Sibling)  Family history of acute myocardial infarction (Sibling)  Family history of diabetes mellitus (Father, Sibling)    PAST SOCIAL HISTORY:    ALLERGIES & MEDICATIONS  --------------------------------------------------------------------------------  Allergies    PC Pen VK (Hives)    Intolerances      Standing Inpatient Medications  atorvastatin 10 milliGRAM(s) Oral at bedtime  docusate sodium 100 milliGRAM(s) Oral two times a day  heparin  Infusion.  Unit(s)/Hr IV Continuous <Continuous>  pantoprazole    Tablet 40 milliGRAM(s) Oral before breakfast  sodium bicarbonate 650 milliGRAM(s) Oral three times a day    PRN Inpatient Medications  heparin  Injectable 6000 Unit(s) IV Push every 6 hours PRN  heparin  Injectable 3000 Unit(s) IV Push every 6 hours PRN      REVIEW OF SYSTEMS  --------------------------------------------------------------------------------  Gen: + fatigue/weakness chronically (years), no fevers/chills  Skin: No rashes  Respiratory: No dyspnea, cough, wheezing, hemoptysis  CV: No chest pain, PND, orthopnea, + palpitations, diaphoresis with AM episode  GI: No abdominal pain, + N/V (two episodes this morning in the ED)  : No increased frequency, dysuria, hematuria, nocturia  MSK: No joint pain/swelling; + shoulder and upper back pain with episode; + LE edema  Neuro: + dizziness/lightheadedness with episode      All other systems were reviewed and are negative, except as noted.    VITALS/PHYSICAL EXAM  --------------------------------------------------------------------------------  T(C): 37.1 (08-02-18 @ 13:41), Max: 37.1 (08-02-18 @ 08:30)  HR: 66 (08-02-18 @ 13:41) (58 - 84)  BP: 100/74 (08-02-18 @ 13:41) (100/74 - 134/81)  RR: 18 (08-02-18 @ 13:41) (16 - 18)  SpO2: 100% (08-02-18 @ 13:41) (100% - 100%)  Wt(kg): --  Height (cm): 162.56 (08-02-18 @ 12:02)  Weight (kg): 75.3 (08-02-18 @ 12:02)  BMI (kg/m2): 28.5 (08-02-18 @ 12:02)  BSA (m2): 1.81 (08-02-18 @ 12:02)      Physical Exam:  Gen: NAD, well-appearing  HEENT: PERRL, supple neck, clear oropharynx  Pulm: CTA B/L  CV: RRR, S1S2; no rub  Back: No spinal or CVA tenderness; no sacral edema  Abd: +BS, soft, nontender/nondistended  : No suprapubic tenderness  LE: Warm, FROM, no clubbing, intact strength; 1+ LE edema  Skin: Warm, without rashes      LABS/STUDIES  --------------------------------------------------------------------------------              10.5   6.45  >-----------<  270      [08-02-18 @ 06:00]              35.2     139  |  106  |  25  ----------------------------<  126      [08-02-18 @ 06:00]  4.4   |  17  |  3.48        Ca     8.7     [08-02-18 @ 06:00]    TPro  8.1  /  Alb  4.1  /  TBili  0.3  /  DBili  x   /  AST  13  /  ALT  8   /  AlkPhos  240  [08-02-18 @ 06:00]    PT/INR: PT 10.5 , INR 0.91       [08-02-18 @ 06:00]  PTT: 29.0       [08-02-18 @ 06:00]      Creatinine Trend:  SCr 3.48 [08-02 @ 06:00]    Urinalysis - [08-02-18 @ 06:00]      Color COLORL / Appearance CLEAR / SG 1.005 / pH 6.5      Gluc NEGATIVE / Ketone NEGATIVE  / Bili NEGATIVE / Urobili NORMAL       Blood NEGATIVE / Protein NEGATIVE / Leuk Est NEGATIVE / Nitrite NEGATIVE      RBC 0-2 / WBC 0-2 / Hyaline  / Gran  / Sq Epi OCC / Non Sq Epi  / Bacteria FEW      HbA1c 6.0      [06-17-17 @ 06:58]  TSH 2.73      [08-02-18 @ 06:00]

## 2018-08-02 NOTE — H&P ADULT - ATTENDING COMMENTS
Pt seen and examined at bedside. agree with above assessment.   67-year-old female with CKD (unknown etiology, not HTN or DM, refused Biopsy in the past, no HD) baseline creat 3.4-3.5, HLD and HTN presenting with dizziness and palpitations occurring overnight. She states that she started feeling the palpitations yesterday night and tried to sleep it off. Woke up in the am with extreme fatigue, palpitations, Chest heavyness and dizziness . Was brought to the ER and found to be in afib, spontaneously converted to SR in the 60's in the ER. CT head done in the ER showed chronic infarct in PICA territory and but to symptoms of imbalance Neuro was consulted.     EKG : Afib , VR in 90's   CT head: Chronic PICA stroke   Echo pending     Assessment and Plan   Afib (Chads2 VASc= 5 ): C/W heparin ggt for, Likely eliquis 2.5 bid on D/C, Will get echo to assess LV function, Currently in SR in the 60. will consider adding Toprol 12.5 BID if BP tolerates     Chronic PICA infarct: with dizziness and gait disturbances: will get neuro on board, Will likely need MRI     CKD : Unknown etiology , Will get renal on board, avoid nephrotoxic meds      DVT: Heparin drip

## 2018-08-02 NOTE — H&P ADULT - ASSESSMENT
67-year-old female with ESRD (past 7-8 years, no HD), HLD (>10 years) and borderline HTN presenting due to dizziness and palpitations, work up for atrial fibrillation    EKG: Atrial fibrillation at 102 bpm with peaked T waves 67-year-old female with CKD (past 7-8 years, no HD), HLD (>10 years) and borderline HTN presenting due to dizziness and palpitations a/w atrial fibrillation    EKG: Atrial fibrillation at 102

## 2018-08-02 NOTE — H&P ADULT - NEGATIVE CARDIOVASCULAR SYMPTOMS
no chest pain/no paroxysmal nocturnal dyspnea/no claudication/See HPI, has had peripheral edema for past several years/no orthopnea no orthopnea/no paroxysmal nocturnal dyspnea/no claudication/no chest pain

## 2018-08-02 NOTE — ED PROVIDER NOTE - MEDICAL DECISION MAKING DETAILS
Gordo PGY2: 67F hx of ESRD no HD, HLD presents with a cc of dizziness last night per daughter reports prior to bed pt experiencing chest palpitations with HR 's; pt then went to sleep woke in middle of night went to bathroom had episode where became lightheaded and dizzy, pale, clammy and sweating, a/w SOB, lost balance, did not fall, no head trauma no LOC recalls entire event Gordo PGY2: 67F hx of ESRD no HD, HLD presents with a cc of dizziness last night per daughter reports prior to bed pt experiencing chest palpitations with HR 's; pt then went to sleep woke in middle of night went to bathroom had episode where became lightheaded and dizzy, pale, clammy and sweating, a/w SOB, lost balance, did not fall, no head trauma no LOC recalls entire event ekg c/f afib with rvr HR 130s at bedside, exam HR irregular irregular c/f new afib will eval for etiology of new afib will check labs cardiacs ekg cxr likely admit

## 2018-08-02 NOTE — ED PROVIDER NOTE - ATTENDING CONTRIBUTION TO CARE
DR. JACOME, ATTENDING MD-  I performed a face to face bedside interview with patient regarding history of present illness, review of symptoms and past medical history. I completed an independent physical exam.  I have discussed patient's plan of care with the resident.   Documentation as above in the note.   HPI: 67F hx of ESRD no HD, HLD presents with a cc of dizziness last night per daughter reports prior to bed pt experiencing chest palpitations with HR 's; pt then went to sleep woke in middle of night went to bathroom had episode where became lightheaded and dizzy, pale, clammy and sweating, a/w SOB, lost balance, did not fall, no head trauma no LOC recalls entire event - prompting ED visit - since episode no recurrence of palpitations. No prior hx of afib. + nausea, no vomiting. Denies /v/f/c. Denies headache, . Denies abdominal pain. Denies dysuria, hematuria, hematochezia, BRBPR, tarry stools, diarrhea, constipation.  EXAM: Well appearing, NAD, heart IRReg rate and rhythm, lungs ctab, no pitting edema in BLE. Neuro CN 2-12 intact, no focal deficits.   MDM: Concern for new onset Afib leading to dizziness. Neuro exam normal. EKG shows Afib with RVR. Will obtain labs, imaging, r/o electrolyte abn vs infection vs ACS and admit. Will provide meds to control RVR.

## 2018-08-02 NOTE — CONSULT NOTE ADULT - ATTENDING COMMENTS
I have seen and examined this patient with the stroke neurology team.     History was reviewed with the patient and/or available family members.   ROS: All negative except documented in the HPI.   Neurological exam was performed and agree with exam as documented above.   Laboratory results and imaging studies were reviewed by me.   I agree with the neurology resident note as documented above. Please see above.

## 2018-08-02 NOTE — H&P ADULT - PROBLEM SELECTOR PLAN 5
Fall precautions Lifestyle modification to maintain prehypertensive status. Low NA, low fats, low carbs.

## 2018-08-02 NOTE — H&P ADULT - NEGATIVE ENMT SYMPTOMS
no dysphagia/no nose bleeds/no tinnitus/no hearing difficulty/Episode of vertigo in 2017, resolved./no vertigo/no ear pain

## 2018-08-03 LAB
ALBUMIN SERPL ELPH-MCNC: 3.6 G/DL — SIGNIFICANT CHANGE UP (ref 3.3–5)
ALP SERPL-CCNC: 194 U/L — HIGH (ref 40–120)
ALT FLD-CCNC: 7 U/L — SIGNIFICANT CHANGE UP (ref 4–33)
APTT BLD: 144.6 SEC — CRITICAL HIGH (ref 27.5–37.4)
APTT BLD: > 200 SEC — CRITICAL HIGH (ref 27.5–37.4)
AST SERPL-CCNC: 13 U/L — SIGNIFICANT CHANGE UP (ref 4–32)
BASOPHILS # BLD AUTO: 0.02 K/UL — SIGNIFICANT CHANGE UP (ref 0–0.2)
BASOPHILS NFR BLD AUTO: 0.3 % — SIGNIFICANT CHANGE UP (ref 0–2)
BILIRUB SERPL-MCNC: 0.2 MG/DL — SIGNIFICANT CHANGE UP (ref 0.2–1.2)
BUN SERPL-MCNC: 24 MG/DL — HIGH (ref 7–23)
CALCIUM SERPL-MCNC: 8.7 MG/DL — SIGNIFICANT CHANGE UP (ref 8.4–10.5)
CHLORIDE SERPL-SCNC: 110 MMOL/L — HIGH (ref 98–107)
CHOLEST SERPL-MCNC: 126 MG/DL — SIGNIFICANT CHANGE UP (ref 120–199)
CO2 SERPL-SCNC: 18 MMOL/L — LOW (ref 22–31)
CREAT SERPL-MCNC: 3.36 MG/DL — HIGH (ref 0.5–1.3)
EOSINOPHIL # BLD AUTO: 0.17 K/UL — SIGNIFICANT CHANGE UP (ref 0–0.5)
EOSINOPHIL NFR BLD AUTO: 2.8 % — SIGNIFICANT CHANGE UP (ref 0–6)
GLUCOSE SERPL-MCNC: 108 MG/DL — HIGH (ref 70–99)
HBA1C BLD-MCNC: 5.8 % — HIGH (ref 4–5.6)
HCT VFR BLD CALC: 28.9 % — LOW (ref 34.5–45)
HDLC SERPL-MCNC: 34 MG/DL — LOW (ref 45–65)
HGB BLD-MCNC: 8.9 G/DL — LOW (ref 11.5–15.5)
IMM GRANULOCYTES # BLD AUTO: 0.02 # — SIGNIFICANT CHANGE UP
IMM GRANULOCYTES NFR BLD AUTO: 0.3 % — SIGNIFICANT CHANGE UP (ref 0–1.5)
LIPID PNL WITH DIRECT LDL SERPL: 90 MG/DL — SIGNIFICANT CHANGE UP
LYMPHOCYTES # BLD AUTO: 2.2 K/UL — SIGNIFICANT CHANGE UP (ref 1–3.3)
LYMPHOCYTES # BLD AUTO: 36.6 % — SIGNIFICANT CHANGE UP (ref 13–44)
MAGNESIUM SERPL-MCNC: 2.1 MG/DL — SIGNIFICANT CHANGE UP (ref 1.6–2.6)
MCHC RBC-ENTMCNC: 26.3 PG — LOW (ref 27–34)
MCHC RBC-ENTMCNC: 30.8 % — LOW (ref 32–36)
MCV RBC AUTO: 85.5 FL — SIGNIFICANT CHANGE UP (ref 80–100)
MONOCYTES # BLD AUTO: 0.49 K/UL — SIGNIFICANT CHANGE UP (ref 0–0.9)
MONOCYTES NFR BLD AUTO: 8.2 % — SIGNIFICANT CHANGE UP (ref 2–14)
NEUTROPHILS # BLD AUTO: 3.11 K/UL — SIGNIFICANT CHANGE UP (ref 1.8–7.4)
NEUTROPHILS NFR BLD AUTO: 51.8 % — SIGNIFICANT CHANGE UP (ref 43–77)
NRBC # FLD: 0 — SIGNIFICANT CHANGE UP
PHOSPHATE SERPL-MCNC: 3.9 MG/DL — SIGNIFICANT CHANGE UP (ref 2.5–4.5)
PLATELET # BLD AUTO: 224 K/UL — SIGNIFICANT CHANGE UP (ref 150–400)
PMV BLD: 10.4 FL — SIGNIFICANT CHANGE UP (ref 7–13)
POTASSIUM SERPL-MCNC: 4.9 MMOL/L — SIGNIFICANT CHANGE UP (ref 3.5–5.3)
POTASSIUM SERPL-SCNC: 4.9 MMOL/L — SIGNIFICANT CHANGE UP (ref 3.5–5.3)
PROT SERPL-MCNC: 6.7 G/DL — SIGNIFICANT CHANGE UP (ref 6–8.3)
RBC # BLD: 3.38 M/UL — LOW (ref 3.8–5.2)
RBC # FLD: 14 % — SIGNIFICANT CHANGE UP (ref 10.3–14.5)
SODIUM SERPL-SCNC: 141 MMOL/L — SIGNIFICANT CHANGE UP (ref 135–145)
TRIGL SERPL-MCNC: 82 MG/DL — SIGNIFICANT CHANGE UP (ref 10–149)
WBC # BLD: 6.01 K/UL — SIGNIFICANT CHANGE UP (ref 3.8–10.5)
WBC # FLD AUTO: 6.01 K/UL — SIGNIFICANT CHANGE UP (ref 3.8–10.5)

## 2018-08-03 PROCEDURE — 93306 TTE W/DOPPLER COMPLETE: CPT | Mod: 26

## 2018-08-03 PROCEDURE — 99232 SBSQ HOSP IP/OBS MODERATE 35: CPT | Mod: GC

## 2018-08-03 PROCEDURE — 99223 1ST HOSP IP/OBS HIGH 75: CPT | Mod: GC

## 2018-08-03 PROCEDURE — 76770 US EXAM ABDO BACK WALL COMP: CPT | Mod: 26

## 2018-08-03 RX ORDER — APIXABAN 2.5 MG/1
5 TABLET, FILM COATED ORAL EVERY 12 HOURS
Qty: 0 | Refills: 0 | Status: DISCONTINUED | OUTPATIENT
Start: 2018-08-03 | End: 2018-08-06

## 2018-08-03 RX ADMIN — HEPARIN SODIUM 700 UNIT(S)/HR: 5000 INJECTION INTRAVENOUS; SUBCUTANEOUS at 15:57

## 2018-08-03 RX ADMIN — HEPARIN SODIUM 0 UNIT(S)/HR: 5000 INJECTION INTRAVENOUS; SUBCUTANEOUS at 14:38

## 2018-08-03 RX ADMIN — APIXABAN 5 MILLIGRAM(S): 2.5 TABLET, FILM COATED ORAL at 18:57

## 2018-08-03 RX ADMIN — HEPARIN SODIUM 900 UNIT(S)/HR: 5000 INJECTION INTRAVENOUS; SUBCUTANEOUS at 06:51

## 2018-08-03 RX ADMIN — HEPARIN SODIUM 0 UNIT(S)/HR: 5000 INJECTION INTRAVENOUS; SUBCUTANEOUS at 05:50

## 2018-08-03 RX ADMIN — PANTOPRAZOLE SODIUM 40 MILLIGRAM(S): 20 TABLET, DELAYED RELEASE ORAL at 04:48

## 2018-08-03 RX ADMIN — Medication 650 MILLIGRAM(S): at 21:49

## 2018-08-03 RX ADMIN — Medication 650 MILLIGRAM(S): at 14:39

## 2018-08-03 RX ADMIN — Medication 100 MILLIGRAM(S): at 04:49

## 2018-08-03 RX ADMIN — Medication 650 MILLIGRAM(S): at 04:48

## 2018-08-03 RX ADMIN — ATORVASTATIN CALCIUM 10 MILLIGRAM(S): 80 TABLET, FILM COATED ORAL at 21:49

## 2018-08-03 NOTE — PROGRESS NOTE ADULT - PROBLEM SELECTOR PLAN 1
CKD stable. Today sCr 3.36, closer to lowest recorded in 2017 of 3.30.    For baseline studies: please obtain iron panel, PTH and vitamin D 25 levels. Please obtain spot urine protein/Cr ratio.     Please avoid nephrotoxins, renally dose medications, and monitor CMP. If discharged on Eliquis, please renally dose.

## 2018-08-03 NOTE — PROGRESS NOTE ADULT - ASSESSMENT
Pt seen and examined at bedside. agree with above assessment.   67-year-old female with CKD (unknown etiology, not HTN or DM, refused Biopsy in the past, no HD) baseline creat 3.4-3.5, HLD and HTN presenting with dizziness and palpitations occurring overnight. She states that she started feeling the palpitations yesterday night and tried to sleep it off. Woke up in the am with extreme fatigue, palpitations, Chest heavyness and dizziness . Was brought to the ER and found to be in afib, spontaneously converted to SR in the 60's in the ER. CT head done in the ER showed chronic infarct in PICA territory and but to symptoms of imbalance Neuro was consulted.     EKG : Afib , VR in 90's   CT head: Chronic PICA stroke   Echo pending     Assessment and Plan   Afib (Chads2 VASc= 5 ): C/W heparin ggt switch to eliquis 5mg q12 in NSR f/u echo add lopressor 25mg q12    Chronic PICA infarct: with dizziness and gait disturbances: will get neuro on board, get carotid dopplers    CKD : Unknown etiology , Will get renal on board, avoid nephrotoxic meds      DVT: eliquis

## 2018-08-03 NOTE — PROGRESS NOTE ADULT - ASSESSMENT
67 year old female patient w/PMHx significant for CKD stage 5, not on HD, and HTN with sCr on admission of 3.48. Family reports last sCr in May was 3.63. Previous admission in 2017 with lowest sCr of 3.30. Renal US 8/3/2018 showed no hydronephrosis,  and atrophic kidneys with bilateral cortical thinning from chronic disease.

## 2018-08-03 NOTE — CHART NOTE - NSCHARTNOTEFT_GEN_A_CORE
Spoke to Dr Sutton, patient's daughter was offered coumadin as the best Ac for the new onset afib but she preferred Eliquis.

## 2018-08-04 LAB
BUN SERPL-MCNC: 25 MG/DL — HIGH (ref 7–23)
CALCIUM SERPL-MCNC: 9.1 MG/DL — SIGNIFICANT CHANGE UP (ref 8.4–10.5)
CHLORIDE SERPL-SCNC: 108 MMOL/L — HIGH (ref 98–107)
CO2 SERPL-SCNC: 17 MMOL/L — LOW (ref 22–31)
CREAT SERPL-MCNC: 3.47 MG/DL — HIGH (ref 0.5–1.3)
FERRITIN SERPL-MCNC: 37.96 NG/ML — SIGNIFICANT CHANGE UP (ref 15–150)
FOLATE SERPL-MCNC: 11.2 NG/ML — SIGNIFICANT CHANGE UP (ref 4.7–20)
GLUCOSE SERPL-MCNC: 98 MG/DL — SIGNIFICANT CHANGE UP (ref 70–99)
HCT VFR BLD CALC: 32.1 % — LOW (ref 34.5–45)
HGB BLD-MCNC: 9.6 G/DL — LOW (ref 11.5–15.5)
IRON SATN MFR SERPL: 256 UG/DL — SIGNIFICANT CHANGE UP (ref 140–530)
IRON SATN MFR SERPL: 52 UG/DL — SIGNIFICANT CHANGE UP (ref 30–160)
LDH SERPL L TO P-CCNC: 204 U/L — SIGNIFICANT CHANGE UP (ref 135–225)
MAGNESIUM SERPL-MCNC: 2.1 MG/DL — SIGNIFICANT CHANGE UP (ref 1.6–2.6)
MCHC RBC-ENTMCNC: 26.7 PG — LOW (ref 27–34)
MCHC RBC-ENTMCNC: 29.9 % — LOW (ref 32–36)
MCV RBC AUTO: 89.2 FL — SIGNIFICANT CHANGE UP (ref 80–100)
NRBC # FLD: 0 — SIGNIFICANT CHANGE UP
PHOSPHATE SERPL-MCNC: 3.2 MG/DL — SIGNIFICANT CHANGE UP (ref 2.5–4.5)
PLATELET # BLD AUTO: 231 K/UL — SIGNIFICANT CHANGE UP (ref 150–400)
PMV BLD: 10.8 FL — SIGNIFICANT CHANGE UP (ref 7–13)
POTASSIUM SERPL-MCNC: 4.8 MMOL/L — SIGNIFICANT CHANGE UP (ref 3.5–5.3)
POTASSIUM SERPL-SCNC: 4.8 MMOL/L — SIGNIFICANT CHANGE UP (ref 3.5–5.3)
PTH-INTACT SERPL-MCNC: 372.6 PG/ML — HIGH (ref 15–65)
RBC # BLD: 3.6 M/UL — LOW (ref 3.8–5.2)
RBC # FLD: 13.7 % — SIGNIFICANT CHANGE UP (ref 10.3–14.5)
SODIUM SERPL-SCNC: 140 MMOL/L — SIGNIFICANT CHANGE UP (ref 135–145)
UIBC SERPL-MCNC: 204.4 UG/DL — SIGNIFICANT CHANGE UP (ref 110–370)
VIT B12 SERPL-MCNC: 181 PG/ML — LOW (ref 200–900)
WBC # BLD: 5.16 K/UL — SIGNIFICANT CHANGE UP (ref 3.8–10.5)
WBC # FLD AUTO: 5.16 K/UL — SIGNIFICANT CHANGE UP (ref 3.8–10.5)

## 2018-08-04 PROCEDURE — 93880 EXTRACRANIAL BILAT STUDY: CPT | Mod: 26

## 2018-08-04 RX ADMIN — Medication 100 MILLIGRAM(S): at 05:41

## 2018-08-04 RX ADMIN — Medication 100 MILLIGRAM(S): at 18:11

## 2018-08-04 RX ADMIN — PANTOPRAZOLE SODIUM 40 MILLIGRAM(S): 20 TABLET, DELAYED RELEASE ORAL at 05:41

## 2018-08-04 RX ADMIN — Medication 650 MILLIGRAM(S): at 05:41

## 2018-08-04 RX ADMIN — Medication 650 MILLIGRAM(S): at 21:28

## 2018-08-04 RX ADMIN — APIXABAN 5 MILLIGRAM(S): 2.5 TABLET, FILM COATED ORAL at 05:41

## 2018-08-04 RX ADMIN — Medication 650 MILLIGRAM(S): at 13:32

## 2018-08-04 RX ADMIN — ATORVASTATIN CALCIUM 10 MILLIGRAM(S): 80 TABLET, FILM COATED ORAL at 21:28

## 2018-08-04 RX ADMIN — APIXABAN 5 MILLIGRAM(S): 2.5 TABLET, FILM COATED ORAL at 18:11

## 2018-08-04 NOTE — PROGRESS NOTE ADULT - ASSESSMENT
EKG : Afib , VR in 90's , Converted to SR in ED  CT head: Chronic PICA stroke   Echo Edouard BiV function  MRI brain Pending   Carotid doppler pending     Assessment and Plan   Afib (Chads2 VASc= 5 ): C/W eliquis 5mg q12 in NSR , Echo normal    HTN: Start low dose metoprolol 12.5 BID     Chronic PICA infarct: with dizziness and gait disturbances:  neuro on board, Pending MRI and carotid dopplers    CKD : Unknown etiology , renal on board, avoid nephrotoxic meds      DVT: eliquis

## 2018-08-05 LAB
24R-OH-CALCIDIOL SERPL-MCNC: 13 NG/ML — LOW (ref 30–80)
APPEARANCE UR: CLEAR — SIGNIFICANT CHANGE UP
BACTERIA # UR AUTO: SIGNIFICANT CHANGE UP
BILIRUB UR-MCNC: NEGATIVE — SIGNIFICANT CHANGE UP
BLOOD UR QL VISUAL: HIGH
BUN SERPL-MCNC: 23 MG/DL — SIGNIFICANT CHANGE UP (ref 7–23)
CALCIUM SERPL-MCNC: 8.8 MG/DL — SIGNIFICANT CHANGE UP (ref 8.4–10.5)
CHLORIDE SERPL-SCNC: 104 MMOL/L — SIGNIFICANT CHANGE UP (ref 98–107)
CO2 SERPL-SCNC: 18 MMOL/L — LOW (ref 22–31)
COLOR SPEC: SIGNIFICANT CHANGE UP
CREAT SERPL-MCNC: 3.75 MG/DL — HIGH (ref 0.5–1.3)
GLUCOSE SERPL-MCNC: 108 MG/DL — HIGH (ref 70–99)
GLUCOSE UR-MCNC: NEGATIVE — SIGNIFICANT CHANGE UP
HCT VFR BLD CALC: 31.9 % — LOW (ref 34.5–45)
HGB BLD-MCNC: 10.1 G/DL — LOW (ref 11.5–15.5)
KETONES UR-MCNC: NEGATIVE — SIGNIFICANT CHANGE UP
LEUKOCYTE ESTERASE UR-ACNC: HIGH
MAGNESIUM SERPL-MCNC: 1.9 MG/DL — SIGNIFICANT CHANGE UP (ref 1.6–2.6)
MCHC RBC-ENTMCNC: 26.8 PG — LOW (ref 27–34)
MCHC RBC-ENTMCNC: 31.7 % — LOW (ref 32–36)
MCV RBC AUTO: 84.6 FL — SIGNIFICANT CHANGE UP (ref 80–100)
MUCOUS THREADS # UR AUTO: SIGNIFICANT CHANGE UP
NITRITE UR-MCNC: NEGATIVE — SIGNIFICANT CHANGE UP
NON-SQ EPI CELLS # UR AUTO: <1 — SIGNIFICANT CHANGE UP
NRBC # FLD: 0 — SIGNIFICANT CHANGE UP
PH UR: 7.5 — SIGNIFICANT CHANGE UP (ref 4.6–8)
PHOSPHATE SERPL-MCNC: 2.6 MG/DL — SIGNIFICANT CHANGE UP (ref 2.5–4.5)
PLATELET # BLD AUTO: 233 K/UL — SIGNIFICANT CHANGE UP (ref 150–400)
PMV BLD: 10.7 FL — SIGNIFICANT CHANGE UP (ref 7–13)
POTASSIUM SERPL-MCNC: 4.7 MMOL/L — SIGNIFICANT CHANGE UP (ref 3.5–5.3)
POTASSIUM SERPL-SCNC: 4.7 MMOL/L — SIGNIFICANT CHANGE UP (ref 3.5–5.3)
PROT UR-MCNC: 30 MG/DL — HIGH
RBC # BLD: 3.77 M/UL — LOW (ref 3.8–5.2)
RBC # FLD: 13.3 % — SIGNIFICANT CHANGE UP (ref 10.3–14.5)
RBC CASTS # UR COMP ASSIST: SIGNIFICANT CHANGE UP (ref 0–?)
SODIUM SERPL-SCNC: 135 MMOL/L — SIGNIFICANT CHANGE UP (ref 135–145)
SP GR SPEC: 1.01 — SIGNIFICANT CHANGE UP (ref 1–1.04)
UROBILINOGEN FLD QL: NORMAL MG/DL — SIGNIFICANT CHANGE UP
WBC # BLD: 9.36 K/UL — SIGNIFICANT CHANGE UP (ref 3.8–10.5)
WBC # FLD AUTO: 9.36 K/UL — SIGNIFICANT CHANGE UP (ref 3.8–10.5)
WBC CLUMPS #/AREA URNS HPF: PRESENT — HIGH (ref 0–?)
WBC UR QL: >50 — HIGH (ref 0–?)

## 2018-08-05 PROCEDURE — 70547 MR ANGIOGRAPHY NECK W/O DYE: CPT | Mod: 26

## 2018-08-05 PROCEDURE — 70551 MRI BRAIN STEM W/O DYE: CPT | Mod: 26

## 2018-08-05 RX ORDER — CEFTRIAXONE 500 MG/1
INJECTION, POWDER, FOR SOLUTION INTRAMUSCULAR; INTRAVENOUS
Qty: 0 | Refills: 0 | Status: DISCONTINUED | OUTPATIENT
Start: 2018-08-05 | End: 2018-08-06

## 2018-08-05 RX ORDER — ERGOCALCIFEROL 1.25 MG/1
50000 CAPSULE ORAL
Qty: 0 | Refills: 0 | Status: DISCONTINUED | OUTPATIENT
Start: 2018-08-05 | End: 2018-08-06

## 2018-08-05 RX ORDER — CEFTRIAXONE 500 MG/1
1 INJECTION, POWDER, FOR SOLUTION INTRAMUSCULAR; INTRAVENOUS ONCE
Qty: 0 | Refills: 0 | Status: COMPLETED | OUTPATIENT
Start: 2018-08-05 | End: 2018-08-05

## 2018-08-05 RX ORDER — METOPROLOL TARTRATE 50 MG
12.5 TABLET ORAL
Qty: 0 | Refills: 0 | Status: DISCONTINUED | OUTPATIENT
Start: 2018-08-05 | End: 2018-08-06

## 2018-08-05 RX ORDER — CEFTRIAXONE 500 MG/1
1 INJECTION, POWDER, FOR SOLUTION INTRAMUSCULAR; INTRAVENOUS EVERY 24 HOURS
Qty: 0 | Refills: 0 | Status: DISCONTINUED | OUTPATIENT
Start: 2018-08-06 | End: 2018-08-06

## 2018-08-05 RX ADMIN — PANTOPRAZOLE SODIUM 40 MILLIGRAM(S): 20 TABLET, DELAYED RELEASE ORAL at 05:01

## 2018-08-05 RX ADMIN — ERGOCALCIFEROL 50000 UNIT(S): 1.25 CAPSULE ORAL at 20:58

## 2018-08-05 RX ADMIN — APIXABAN 5 MILLIGRAM(S): 2.5 TABLET, FILM COATED ORAL at 05:01

## 2018-08-05 RX ADMIN — Medication 650 MILLIGRAM(S): at 20:59

## 2018-08-05 RX ADMIN — Medication 650 MILLIGRAM(S): at 05:01

## 2018-08-05 RX ADMIN — ATORVASTATIN CALCIUM 10 MILLIGRAM(S): 80 TABLET, FILM COATED ORAL at 20:58

## 2018-08-05 RX ADMIN — CEFTRIAXONE 100 GRAM(S): 500 INJECTION, POWDER, FOR SOLUTION INTRAMUSCULAR; INTRAVENOUS at 15:21

## 2018-08-05 RX ADMIN — APIXABAN 5 MILLIGRAM(S): 2.5 TABLET, FILM COATED ORAL at 17:27

## 2018-08-05 RX ADMIN — Medication 650 MILLIGRAM(S): at 15:21

## 2018-08-05 RX ADMIN — Medication 12.5 MILLIGRAM(S): at 17:25

## 2018-08-05 NOTE — PROGRESS NOTE ADULT - ASSESSMENT
EKG : Afib , VR in 90's , Converted to SR in ED  CT head: Chronic PICA stroke   Echo Edouard BiV function  MRI brain Pending   Carotid doppler right: ICA 1-15%, Lt ICA normal velocities     Assessment and Plan   Afib (Chads2 VASc= 5 ): C/W eliquis 5mg q12 in NSR , Echo normal    HTN: c/w metoprolol 12.5 BID     UTI: UA positive , culture pending start cetriaxone     Chronic PICA infarct: with dizziness and gait disturbances:  neuro on board, MRI done pending  results    CKD : Unknown etiology , renal on board, avoid nephrotoxic meds      DVT: eliquis

## 2018-08-06 ENCOUNTER — TRANSCRIPTION ENCOUNTER (OUTPATIENT)
Age: 68
End: 2018-08-06

## 2018-08-06 VITALS
DIASTOLIC BLOOD PRESSURE: 81 MMHG | HEART RATE: 65 BPM | RESPIRATION RATE: 18 BRPM | OXYGEN SATURATION: 100 % | TEMPERATURE: 99 F | SYSTOLIC BLOOD PRESSURE: 134 MMHG

## 2018-08-06 LAB
BUN SERPL-MCNC: 25 MG/DL — HIGH (ref 7–23)
BUN SERPL-MCNC: 25 MG/DL — HIGH (ref 7–23)
CALCIUM SERPL-MCNC: 8.9 MG/DL — SIGNIFICANT CHANGE UP (ref 8.4–10.5)
CALCIUM SERPL-MCNC: 8.9 MG/DL — SIGNIFICANT CHANGE UP (ref 8.4–10.5)
CHLORIDE SERPL-SCNC: 103 MMOL/L — SIGNIFICANT CHANGE UP (ref 98–107)
CHLORIDE SERPL-SCNC: 103 MMOL/L — SIGNIFICANT CHANGE UP (ref 98–107)
CO2 SERPL-SCNC: 18 MMOL/L — LOW (ref 22–31)
CO2 SERPL-SCNC: 18 MMOL/L — LOW (ref 22–31)
CREAT SERPL-MCNC: 3.83 MG/DL — HIGH (ref 0.5–1.3)
CREAT SERPL-MCNC: 3.83 MG/DL — HIGH (ref 0.5–1.3)
GLUCOSE SERPL-MCNC: 96 MG/DL — SIGNIFICANT CHANGE UP (ref 70–99)
GLUCOSE SERPL-MCNC: 96 MG/DL — SIGNIFICANT CHANGE UP (ref 70–99)
HCT VFR BLD CALC: 31.5 % — LOW (ref 34.5–45)
HGB BLD-MCNC: 9.8 G/DL — LOW (ref 11.5–15.5)
MAGNESIUM SERPL-MCNC: 2.1 MG/DL — SIGNIFICANT CHANGE UP (ref 1.6–2.6)
MCHC RBC-ENTMCNC: 26.8 PG — LOW (ref 27–34)
MCHC RBC-ENTMCNC: 31.1 % — LOW (ref 32–36)
MCV RBC AUTO: 86.1 FL — SIGNIFICANT CHANGE UP (ref 80–100)
NRBC # FLD: 0 — SIGNIFICANT CHANGE UP
PHOSPHATE SERPL-MCNC: 3.4 MG/DL — SIGNIFICANT CHANGE UP (ref 2.5–4.5)
PLATELET # BLD AUTO: 225 K/UL — SIGNIFICANT CHANGE UP (ref 150–400)
PMV BLD: 11 FL — SIGNIFICANT CHANGE UP (ref 7–13)
POTASSIUM SERPL-MCNC: 4.7 MMOL/L — SIGNIFICANT CHANGE UP (ref 3.5–5.3)
POTASSIUM SERPL-MCNC: 4.7 MMOL/L — SIGNIFICANT CHANGE UP (ref 3.5–5.3)
POTASSIUM SERPL-SCNC: 4.7 MMOL/L — SIGNIFICANT CHANGE UP (ref 3.5–5.3)
POTASSIUM SERPL-SCNC: 4.7 MMOL/L — SIGNIFICANT CHANGE UP (ref 3.5–5.3)
RBC # BLD: 3.66 M/UL — LOW (ref 3.8–5.2)
RBC # FLD: 13.6 % — SIGNIFICANT CHANGE UP (ref 10.3–14.5)
SODIUM SERPL-SCNC: 137 MMOL/L — SIGNIFICANT CHANGE UP (ref 135–145)
SODIUM SERPL-SCNC: 137 MMOL/L — SIGNIFICANT CHANGE UP (ref 135–145)
SPECIMEN SOURCE: SIGNIFICANT CHANGE UP
WBC # BLD: 7.13 K/UL — SIGNIFICANT CHANGE UP (ref 3.8–10.5)
WBC # FLD AUTO: 7.13 K/UL — SIGNIFICANT CHANGE UP (ref 3.8–10.5)

## 2018-08-06 PROCEDURE — 99232 SBSQ HOSP IP/OBS MODERATE 35: CPT | Mod: GC

## 2018-08-06 RX ORDER — CIPROFLOXACIN LACTATE 400MG/40ML
1 VIAL (ML) INTRAVENOUS
Qty: 4 | Refills: 0 | OUTPATIENT
Start: 2018-08-06 | End: 2018-08-10

## 2018-08-06 RX ORDER — METOPROLOL TARTRATE 50 MG
0.5 TABLET ORAL
Qty: 30 | Refills: 0 | OUTPATIENT
Start: 2018-08-06 | End: 2018-09-04

## 2018-08-06 RX ORDER — APIXABAN 2.5 MG/1
1 TABLET, FILM COATED ORAL
Qty: 60 | Refills: 0 | OUTPATIENT
Start: 2018-08-06 | End: 2018-09-04

## 2018-08-06 RX ORDER — ERGOCALCIFEROL 1.25 MG/1
1 CAPSULE ORAL
Qty: 0 | Refills: 0 | COMMUNITY
Start: 2018-08-06

## 2018-08-06 RX ORDER — AMLODIPINE BESYLATE 2.5 MG/1
1 TABLET ORAL
Qty: 0 | Refills: 0 | COMMUNITY

## 2018-08-06 RX ADMIN — APIXABAN 5 MILLIGRAM(S): 2.5 TABLET, FILM COATED ORAL at 05:06

## 2018-08-06 RX ADMIN — Medication 12.5 MILLIGRAM(S): at 05:07

## 2018-08-06 RX ADMIN — CEFTRIAXONE 100 GRAM(S): 500 INJECTION, POWDER, FOR SOLUTION INTRAMUSCULAR; INTRAVENOUS at 16:38

## 2018-08-06 RX ADMIN — Medication 650 MILLIGRAM(S): at 05:06

## 2018-08-06 RX ADMIN — PANTOPRAZOLE SODIUM 40 MILLIGRAM(S): 20 TABLET, DELAYED RELEASE ORAL at 05:06

## 2018-08-06 RX ADMIN — Medication 100 MILLIGRAM(S): at 05:06

## 2018-08-06 NOTE — DISCHARGE NOTE ADULT - PLAN OF CARE
To restore or maintain a normal heart rate and rhythm, to prevent blood clots, and decrease the risks of stroke CVA/TIA. Please take your medications as prescribed.  Continue to take your blood thinner as prescribed and follow with your physician to monitor your levels.  Low fat diet, reduce caffeine intake, and exercise at least 30 minutes daily.

## 2018-08-06 NOTE — PROGRESS NOTE ADULT - PROBLEM SELECTOR PLAN 1
Pt with CKD in the setting of HTN. Baseline Scr ranging from 3.3 to3.7. sCr elevated to 3.83. Please avoid nephrotoxins, renally dose medications, and monitor CMP. Adjust eliquis to 2.5 mg BID, please renally dose all medications.

## 2018-08-06 NOTE — DISCHARGE NOTE ADULT - CARE PLAN
Principal Discharge DX:	Atrial fibrillation, unspecified type  Goal:	To restore or maintain a normal heart rate and rhythm, to prevent blood clots, and decrease the risks of stroke CVA/TIA.  Assessment and plan of treatment:	Please take your medications as prescribed.  Continue to take your blood thinner as prescribed and follow with your physician to monitor your levels.  Low fat diet, reduce caffeine intake, and exercise at least 30 minutes daily.

## 2018-08-06 NOTE — DISCHARGE NOTE ADULT - HOSPITAL COURSE
68 yo Willie speaking female p/w palp's since last night then had a Pre-syncopal episode a/w New Onset Afib, s/p heparin gtt    +Afib on Eliquis , Lopressor   EKG- Afib @ 102  CXR- clear lungs  Trop 30 -> 26  BUN/ Creat- 25/3.48  Hgb 10.5  Head CT- Microvascular disease. Interval small chronic appearing right PICA territory infarction. No acute intracranial hemorrhage.  8/3- Echo- EF 61%, 1. Mitral annular calcification, otherwise normal mitral  valve. Minimal mitral regurgitation. Calcified trileaflet aortic valve with normal opening.  Mild aortic regurgitation. Normal left ventricular internal dimensions and wall  thicknesses. Endocardium not well visualized; grossly normal left ventricular systolic function.. The right ventricle is not well visualized; grossly normal right ventricular systolic function.  8/3- Renal sono- No hydronephrosis of either kidney. Atrophic kidneys with bilateral cortical thinning, probably sequela of renal parenchymal disease.  8/4- MD note -HTN: Start low dose metoprolol 12.5 BID, Chronic PICA infarct: with dizziness and gait disturbances:  neuro on board, Pending MRI and carotid dopplers    8/5 MRA H/N: Brain MRI: No acute hemorrhage or infarct. Stable microvascular ischemic   change.  Neck MRA: No evidence for hemodynamically significant stenosis.  Brain MRA: No occlusion or vascular stenosis.      Discussed with MD - pt stable for discharge home, pt with no complaints, discharge medications reviewed with MD. 68 yo Willie speaking female p/w palp's since last night then had a Pre-syncopal episode a/w New Onset Afib, s/p heparin gtt    +Afib on Eliquis , Lopressor   EKG- Afib @ 102  CXR- clear lungs  Trop 30 -> 26  BUN/ Creat- 25/3.48  Hgb 10.5  Head CT- Microvascular disease. Interval small chronic appearing right PICA territory infarction. No acute intracranial hemorrhage.  8/3- Echo- EF 61%, 1. Mitral annular calcification, otherwise normal mitral  valve. Minimal mitral regurgitation. Calcified trileaflet aortic valve with normal opening.  Mild aortic regurgitation. Normal left ventricular internal dimensions and wall  thicknesses. Endocardium not well visualized; grossly normal left ventricular systolic function.. The right ventricle is not well visualized; grossly normal right ventricular systolic function.  8/3- Renal sono- No hydronephrosis of either kidney. Atrophic kidneys with bilateral cortical thinning, probably sequela of renal parenchymal disease.  8/4- MD note -HTN: Start low dose metoprolol 12.5 BID, Chronic PICA infarct: with dizziness and gait disturbances:  neuro on board, Pending MRI and carotid dopplers    8/5 MRA H/N: Brain MRI: No acute hemorrhage or infarct. Stable microvascular ischemic   change.  Neck MRA: No evidence for hemodynamically significant stenosis.  Brain MRA: No occlusion or vascular stenosis.    Reviewed with Renal  Discussed with MD - pt stable for discharge home, pt with no complaints, discharge medications reviewed with MD.

## 2018-08-06 NOTE — DISCHARGE NOTE ADULT - MEDICATION SUMMARY - MEDICATIONS TO TAKE
I will START or STAY ON the medications listed below when I get home from the hospital:    sodium bicarbonate 650 mg oral tablet  -- 1 tab(s) by mouth 3 times a day  -- Indication: For Renal failure    apixaban 2.5 mg oral tablet  -- 1 tab(s) by mouth 2 times a day   -- Indication: For Atrial fibrillation    Lipitor 10 mg oral tablet  -- 1 tab(s) by mouth once a day  -- Indication: For Hyperlipidemia    metoprolol tartrate 25 mg oral tablet  -- 0.5 tab(s) by mouth 2 times a day   -- It is very important that you take or use this exactly as directed.  Do not skip doses or discontinue unless directed by your doctor.  May cause drowsiness.  Alcohol may intensify this effect.  Use care when operating dangerous machinery.  Some non-prescription drugs may aggravate your condition.  Read all labels carefully.  If a warning appears, check with your doctor before taking.  Take with food or milk.  This drug may impair the ability to drive or operate machinery.  Use care until you become familiar with its effects.    -- Indication: For Atrial fibrillation    Colace 100 mg oral capsule  -- 1 cap(s) by mouth 2 times a day  -- Indication: For Constipation    omeprazole 20 mg oral delayed release tablet  -- 1 tab(s) by mouth once a day  -- Indication: For GERD    Cipro 250 mg oral tablet  -- 1 tab(s) by mouth every 18 hours   -- Avoid prolonged or excessive exposure to direct and/or artificial sunlight while taking this medication.  Check with your doctor before becoming pregnant.  Do not take dairy products, antacids, or iron preparations within one hour of this medication.  Finish all this medication unless otherwise directed by prescriber.  Medication should be taken with plenty of water.    -- Indication: For UTI    ergocalciferol 50,000 intl units (1.25 mg) oral capsule  -- 1 cap(s) by mouth once a week  -- Indication: For Supplement

## 2018-08-06 NOTE — PROGRESS NOTE ADULT - ATTENDING COMMENTS
Cr stable. Renal dose medications. Patient needs close follow up with primary nephrologist- this week.

## 2018-08-06 NOTE — DISCHARGE NOTE ADULT - PATIENT PORTAL LINK FT
You can access the [facility] Patient Portal, offered by [facility], by registering with the following website: [email or web address]

## 2018-08-06 NOTE — PROGRESS NOTE ADULT - ASSESSMENT
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:  neuro on board, MRI done pending  results    CKD : Unknown etiology , renal on board, avoid nephrotoxic meds      DVT: eliquis EKG : Afib , VR in 90's , Converted to SR in ED  CT head: Chronic PICA stroke   Echo Edouard BiV function  MRI brain no stroke no acute disease  Carotid doppler right: ICA 1-15%, Lt ICA normal velocities     Assessment and Plan   Afib (Chads2 VASc= 5 ): C/W eliquis 5mg q12 in NSR , Echo normal    HTN: c/w metoprolol 12.5 BID     UTI: UA positive , culture pending on cetriaxone , switch to cipro 500 BID after todays dose of ceftriaxone     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

## 2018-08-06 NOTE — PROGRESS NOTE ADULT - SUBJECTIVE AND OBJECTIVE BOX
Tha Sutton MD  Interventional Cardiology / Advance Heart Failure and Cardiac Transplant Specialist  [place] Office : [address] [place] [address] [address]. [address]  Tel:   [place] Office : [address] [facility] [place] [address]  Tel: [phone]  Cell : [phone]    Subjective : Pt lying in bed comfortable, not in distress, denies any chest pain or SOB  	  MEDICATIONS:  apixaban 5 milliGRAM(s) Oral every 12 hours  heparin  Infusion. 1100 Unit(s)/Hr IV Continuous <Continuous>  heparin  Injectable 6000 Unit(s) IV Push every 6 hours PRN  heparin  Injectable 3000 Unit(s) IV Push every 6 hours PRN          docusate sodium 100 milliGRAM(s) Oral two times a day  pantoprazole    Tablet 40 milliGRAM(s) Oral before breakfast    atorvastatin 10 milliGRAM(s) Oral at bedtime    sodium bicarbonate 650 milliGRAM(s) Oral three times a day      PHYSICAL EXAM:  T(C): 36.5 (08-03-18 @ 14:51), Max: 37.1 (08-02-18 @ 17:12)  HR: 65 (08-03-18 @ 14:51) (65 - 74)  BP: 141/75 (08-03-18 @ 14:51) (110/64 - 146/69)  RR: 17 (08-03-18 @ 14:51) (16 - 20)  SpO2: 100% (08-03-18 @ 14:51) (98% - 100%)  Wt(kg): --  I&O's Summary    02 Aug 2018 07:01  -  03 Aug 2018 07:00  --------------------------------------------------------  IN: 186 mL / OUT: 0 mL / NET: 186 mL    03 Aug 2018 07:01  -  03 Aug 2018 15:39  --------------------------------------------------------  IN: 120 mL / OUT: 0 mL / NET: 120 mL      Height (cm): 162.56 (08-02 @ 21:32)  Weight (kg): 72.4 (08-02 @ 21:32)  BMI (kg/m2): 27.4 (08-02 @ 21:32)  BSA (m2): 1.78 (08-02 @ 21:32)    Appearance: Normal	  HEENT:   Normal oral mucosa, PERRL, EOMI	  Cardiovascular: Normal S1 S2, No JVD, No murmurs, No edema  Respiratory: Lungs clear to auscultation	  Gastrointestinal:  Soft, Non-tender, + BS	  Extremities: Normal range of motion, No clubbing, cyanosis or edema                                    8.9    6.01  )-----------( 224      ( 03 Aug 2018 04:51 )             28.9     08-03    141  |  110<H>  |  24<H>  ----------------------------<  108<H>  4.9   |  18<L>  |  3.36<H>    Ca    8.7      03 Aug 2018 04:51  Phos  3.9     08-03  Mg     2.1     08-03    TPro  6.7  /  Alb  3.6  /  TBili  0.2  /  DBili  x   /  AST  13  /  ALT  7   /  AlkPhos  194<H>  08-03    proBNP:   Lipid Profile:   HgA1c: Hemoglobin A1C, Whole Blood: 5.8 % (08-03 @ 04:51)    TSH:
[facility] DIVISION OF KIDNEY DISEASES AND HYPERTENSION -- FOLLOW UP NOTE  --------------------------------------------------------------------------------  HPI: 67 year old female patient w/PMHx significant for CKD stage 5, not on HD, and HTN with sCr on admission of 3.48. Patient follows with outside nephrologist Dr. Ronald Perez. Family reports last sCr in May was 3.63. Previous admission in 2017 with lowest recorded sCr of 3.30. Renal US 2017 showed diffusely increased echogenicity associated with chronic kidney disease.   UTI + on 8/4/18 placed on ceftriaxone.     Pt was seen and examined at bedside with daughter. Denies any complain, looks comfortable. No fever, abdominal pain, SOB      PAST HISTORY  --------------------------------------------------------------------------------  No significant changes to PMH, PSH, FHx, SHx, unless otherwise noted    ALLERGIES & MEDICATIONS  --------------------------------------------------------------------------------  Allergies    PC Pen VK (Hives)    Intolerances      Standing Inpatient Medications  apixaban 5 milliGRAM(s) Oral every 12 hours  atorvastatin 10 milliGRAM(s) Oral at bedtime  cefTRIAXone   IVPB      cefTRIAXone   IVPB 1 Gram(s) IV Intermittent every 24 hours  docusate sodium 100 milliGRAM(s) Oral two times a day  ergocalciferol 75119 Unit(s) Oral <User Schedule>  metoprolol tartrate 12.5 milliGRAM(s) Oral two times a day  pantoprazole    Tablet 40 milliGRAM(s) Oral before breakfast  sodium bicarbonate 650 milliGRAM(s) Oral three times a day    PRN Inpatient Medications      REVIEW OF SYSTEMS  --------------------------------------------------------------------------------  Gen: + fatigue/weakness chronically (years), no fevers/chills  Skin: No rashes  Respiratory: No dyspnea, cough, wheezing, hemoptysis  CV: No chest pain, PND, orthopnea, + palpitations, diaphoresis with AM episode  GI: No abdominal pain, decreased N/ no vomiting  : No increased frequency, dysuria, hematuria, nocturia  MSK: No joint pain/swelling; + shoulder and upper back pain with previous episode; + LE edema   Neuro: + dizziness/lightheadedness with episode    All other systems were reviewed and are negative, except as noted.      VITALS/PHYSICAL EXAM  --------------------------------------------------------------------------------  T(C): 37.1 (08-06-18 @ 12:19), Max: 37.1 (08-05-18 @ 20:55)  HR: 65 (08-06-18 @ 12:19) (65 - 82)  BP: 134/81 (08-06-18 @ 12:19) (118/80 - 140/87)  RR: 18 (08-06-18 @ 12:19) (18 - 18)  SpO2: 100% (08-06-18 @ 12:19) (99% - 100%)  Wt(kg): --    08-05-18 @ 07:01  -  08-06-18 @ 07:00  --------------------------------------------------------  IN: 290 mL / OUT: 200 mL / NET: 90 mL    08-06-18 @ 07:01  -  08-06-18 @ 16:27  --------------------------------------------------------  IN: 240 mL / OUT: 0 mL / NET: 240 mL      Physical Exam:  Gen: NAD, well-appearing  HEENT: PERRL, supple neck, clear oropharynx  Pulm: CTA B/L  CV: RRR, S1S2; no rub  Back: No spinal or CVA tenderness; no sacral edema  Abd: +BS, soft, nontender/nondistended  : No suprapubic tenderness  LE: Warm, FROM, no clubbing, intact strength; 1+ LE edema  Skin: Warm, without rashes    LABS/STUDIES  --------------------------------------------------------------------------------              9.8    7.13  >-----------<  225      [08-06-18 @ 05:45]              31.5     137  |  103  |  25  ----------------------------<  96      [08-06-18 @ 05:45]  4.7   |  18  |  3.83        Ca     8.9     [08-06-18 @ 05:45]      Mg     2.1     [08-06-18 @ 05:45]      Phos  3.4     [08-06-18 @ 05:45]    Creatinine Trend:  SCr 3.83 [08-06 @ 05:45]  SCr 3.75 [08-05 @ 08:08]  SCr 3.47 [08-04 @ 04:00]  SCr 3.36 [08-03 @ 04:51]  SCr 3.48 [08-02 @ 06:00]
Neurology Progress    [facility] [id number]    HPI:  67-year-old female with CKD (past 8 years, no HD), HLD (>10 years) and borderline HTN presenting with dizziness and palpitations occurring overnight. Pt developed intermittent palpitations from 6pm to 10pm and went to sleep by 11pm. Pt.'s daughter states that her mother awoke went to the bathroom to brush her teeth and complained of dizziness within 5 minutes and states she was diaphoretic with cool extremities, also SOB, bilateral shoulder and upper back pain and replied with only yes/no answers. Family assisted pt back to bed and her symptoms lasted approximately 15 minutes. Upon arrival to ED,  heart rate range 's, now 60's, vomited twice since arrival (food, no blood). Pt. admits to fatigue and weakness (past 3 years), SOB on exertion (baseline: 1-2 blocks, worsened with symptoms), swelling in b/l LE (since onset of kidney disease) and cyanosis in LE (past several years). Pt. denies previous episodes of palp's & dizziness, chest pain, orthopnea, PND, LOC, cough or wheeze. (02 Aug 2018 12:02)      Past Medical History  Anemia  HTN (hypertension)  HLD (hyperlipidemia)  Chronic renal failure      Past Surgical History  Status post cataract extraction  Status post cataract extraction  Delivery by emergency caesarean section      MEDICATIONS    apixaban 5 milliGRAM(s) Oral every 12 hours  atorvastatin 10 milliGRAM(s) Oral at bedtime  docusate sodium 100 milliGRAM(s) Oral two times a day  pantoprazole    Tablet 40 milliGRAM(s) Oral before breakfast  sodium bicarbonate 650 milliGRAM(s) Oral three times a day         Family history: No history of dementia, strokes, or seizures   FAMILY HISTORY:  Family history of hypertension (Sibling)  Family history of acute myocardial infarction (Sibling)  Family history of diabetes mellitus (Father, Sibling)    SOCIAL HISTORY -- No history of tobacco or alcohol use     Allergies    PC Pen VK (Hives)    Intolerances            Vital Signs Last 24 Hrs  T(C): 36.6 (04 Aug 2018 21:29), Max: 36.8 (04 Aug 2018 05:40)  T(F): 97.9 (04 Aug 2018 21:29), Max: 98.2 (04 Aug 2018 05:40)  HR: 70 (04 Aug 2018 21:29) (68 - 79)  BP: 130/60 (04 Aug 2018 21:29) (122/564 - 157/88)  BP(mean): 113 (04 Aug 2018 13:19) (77 - 113)  RR: 17 (04 Aug 2018 21:29) (16 - 18)  SpO2: 98% (04 Aug 2018 21:29) (98% - 100%)        On Neurological Examination:    Mental Status - Patient is alert, awake, oriented X3. .   Follows commands well and able to answer questions appropriately. Mood and affect  normal  Follow simple commands able to repeat  able to name.  Speech - Fluent no Dysarthria  no  Aphasia                              Cranial Nerves - Extraocular muscle intact  BETO Facial symmetry Tongue midline, CnV1to V3 intact gross hearing intact      Motor Exam -   Right upper  5/5 throughout  Left upper 5/5 throughtout  Right lower- 5/5 throughout  Left lower 5/5 throughout  Coordination -finger to nose intact  Muscle tone - is normal all over. No asymmetry is seen.      Sensory    Bilateral intact to light touch    GENERAL Exam:     Nontoxic , No Acute Distress   	  HEENT:  normocephalic, atraumatic  		  LUNGS:	Clear bilaterally  No Wheeze      VASCULAR: no carotid brui  	  HEART:	 Normal S1S2   No murmur RRR        	  MUSCULOSKELETAL: Normal Range of Motion  	   SKIN:      Normal   No Ecchymosis               LABS:  CBC Full  -  ( 04 Aug 2018 04:00 )  WBC Count : 5.16 K/uL  Hemoglobin : 9.6 g/dL  Hematocrit : 32.1 %  Platelet Count - Automated : 231 K/uL  Mean Cell Volume : 89.2 fL  Mean Cell Hemoglobin : 26.7 pg  Mean Cell Hemoglobin Concentration : 29.9 %  Auto Neutrophil # : x  Auto Lymphocyte # : x  Auto Monocyte # : x  Auto Eosinophil # : x  Auto Basophil # : x  Auto Neutrophil % : x  Auto Lymphocyte % : x  Auto Monocyte % : x  Auto Eosinophil % : x  Auto Basophil % : x      08-04    140  |  108<H>  |  25<H>  ----------------------------<  98  4.8   |  17<L>  |  3.47<H>    Ca    9.1      04 Aug 2018 04:00  Phos  3.2     08-04  Mg     2.1     08-04    TPro  6.7  /  Alb  3.6  /  TBili  0.2  /  DBili  x   /  AST  13  /  ALT  7   /  AlkPhos  194<H>  08-03    Hemoglobin A1C:     LIVER FUNCTIONS - ( 03 Aug 2018 04:51 )  Alb: 3.6 g/dL / Pro: 6.7 g/dL / ALK PHOS: 194 u/L / ALT: 7 u/L / AST: 13 u/L / GGT: x           Vitamin B12 Vitamin B12, Serum: 181 pg/mL (08-04 @ 04:00)    PTT - ( 03 Aug 2018 13:11 )  PTT:144.6 SEC      RADIOLOGY    EKG              [facility] 
Neurology Progress    [facility] [id number]    HPI:  67-year-old female with CKD (past 8 years, no HD), HLD (>10 years) and borderline HTN presenting with dizziness and palpitations occurring overnight. Pt developed intermittent palpitations from 6pm to 10pm and went to sleep by 11pm. Pt.'s daughter states that her mother awoke went to the bathroom to brush her teeth and complained of dizziness within 5 minutes and states she was diaphoretic with cool extremities, also SOB, bilateral shoulder and upper back pain and replied with only yes/no answers. Family assisted pt back to bed and her symptoms lasted approximately 15 minutes. Upon arrival to ED,  heart rate range 's, now 60's, vomited twice since arrival (food, no blood). Pt. admits to fatigue and weakness (past 3 years), SOB on exertion (baseline: 1-2 blocks, worsened with symptoms), swelling in b/l LE (since onset of kidney disease) and cyanosis in LE (past several years). Pt. denies previous episodes of palp's & dizziness, chest pain, orthopnea, PND, LOC, cough or wheeze. (02 Aug 2018 12:02)      Past Medical History  Anemia  HTN (hypertension)  HLD (hyperlipidemia)  Chronic renal failure      Past Surgical History  Status post cataract extraction  Status post cataract extraction  Delivery by emergency caesarean section      MEDICATIONS    apixaban 5 milliGRAM(s) Oral every 12 hours  atorvastatin 10 milliGRAM(s) Oral at bedtime  docusate sodium 100 milliGRAM(s) Oral two times a day  pantoprazole    Tablet 40 milliGRAM(s) Oral before breakfast  sodium bicarbonate 650 milliGRAM(s) Oral three times a day         Family history: No history of dementia, strokes, or seizures   FAMILY HISTORY:  Family history of hypertension (Sibling)  Family history of acute myocardial infarction (Sibling)  Family history of diabetes mellitus (Father, Sibling)    SOCIAL HISTORY -- No history of tobacco or alcohol use     Allergies    PC Pen VK (Hives)    Intolerances            Vital Signs Last 24 Hrs  T(C): 36.8  HR: 76  BP: 132/60  RR: 16        On Neurological Examination:    Mental Status - Patient is alert, awake, oriented X3. .   Follows commands well and able to answer questions appropriately. Mood and affect  normal  Follow simple commands able to repeat  able to name.  Speech - Fluent no Dysarthria  no  Aphasia                              Cranial Nerves - Extraocular muscle intact  BETO Facial symmetry Tongue midline, CnV1to V3 intact gross hearing intact      Motor Exam -   Right upper  5/5 throughout  Left upper 5/5 throughtout  Right lower- 5/5 throughout  Left lower 5/5 throughout  Coordination -finger to nose intact  Muscle tone - is normal all over. No asymmetry is seen.      Sensory    Bilateral intact to light touch    GENERAL Exam:     Nontoxic , No Acute Distress   	  HEENT:  normocephalic, atraumatic  		  LUNGS:	Clear bilaterally  No Wheeze      VASCULAR: no carotid brui  	  HEART:	 Normal S1S2   No murmur RRR        	  MUSCULOSKELETAL: Normal Range of Motion  	   SKIN:      Normal   No Ecchymosis               LABS:  CBC Full  -  ( 04 Aug 2018 04:00 )  WBC Count : 5.16 K/uL  Hemoglobin : 9.6 g/dL  Hematocrit : 32.1 %  Platelet Count - Automated : 231 K/uL  Mean Cell Volume : 89.2 fL  Mean Cell Hemoglobin : 26.7 pg  Mean Cell Hemoglobin Concentration : 29.9 %  Auto Neutrophil # : x  Auto Lymphocyte # : x  Auto Monocyte # : x  Auto Eosinophil # : x  Auto Basophil # : x  Auto Neutrophil % : x  Auto Lymphocyte % : x  Auto Monocyte % : x  Auto Eosinophil % : x  Auto Basophil % : x      08-04    140  |  108<H>  |  25<H>  ----------------------------<  98  4.8   |  17<L>  |  3.47<H>    Ca    9.1      04 Aug 2018 04:00  Phos  3.2     08-04  Mg     2.1     08-04    TPro  6.7  /  Alb  3.6  /  TBili  0.2  /  DBili  x   /  AST  13  /  ALT  7   /  AlkPhos  194<H>  08-03    Hemoglobin A1C:     LIVER FUNCTIONS - ( 03 Aug 2018 04:51 )  Alb: 3.6 g/dL / Pro: 6.7 g/dL / ALK PHOS: 194 u/L / ALT: 7 u/L / AST: 13 u/L / GGT: x           Vitamin B12 Vitamin B12, Serum: 181 pg/mL (08-04 @ 04:00)    PTT - ( 03 Aug 2018 13:11 )  PTT:144.6 SEC      RADIOLOGY    EKG              schoenberg     Assessment and Plan:   · Assessment		  This is a female with dizziness.  Most likely due to cardiac causes      MRI pending      Electronic Signatures:  Schoenberg, Laura Gray (MD)
[facility] DIVISION OF KIDNEY DISEASES AND HYPERTENSION -- FOLLOW UP NOTE  --------------------------------------------------------------------------------  HPI: 67 year old female patient w/PMHx significant for CKD stage 5, not on HD, and HTN with sCr on admission of 3.48. Patient follows with outside nephrologist Dr. Ronald Perez. Family reports last sCr in May was 3.63. Previous admission in 2017 with lowest recorded sCr of 3.30. Renal US 2017 showed diffusely increased echogenicity associated with chronic kidney disease.     Overnight: patient and daughter report no acute events or changes. No increase in slight LE edema that patient reports is chronic. No change in chronic SOB with exertion reported by patient. Patient's daughter is interested in considering renal transplant at [facility].    Ultrasound Kidney/Bladder performed this AM, results show no hydronephrosis, but "Atrophic kidneys with bilateral cortical thinning, probably sequela of renal parenchymal disease."      PAST HISTORY  --------------------------------------------------------------------------------  No significant changes to PMH, PSH, FHx, SHx, unless otherwise noted    ALLERGIES & MEDICATIONS  --------------------------------------------------------------------------------  Allergies    PC Pen VK (Hives)    Intolerances      Standing Inpatient Medications  atorvastatin 10 milliGRAM(s) Oral at bedtime  docusate sodium 100 milliGRAM(s) Oral two times a day  heparin  Infusion. 1100 Unit(s)/Hr IV Continuous <Continuous>  pantoprazole    Tablet 40 milliGRAM(s) Oral before breakfast  sodium bicarbonate 650 milliGRAM(s) Oral three times a day    PRN Inpatient Medications  heparin  Injectable 6000 Unit(s) IV Push every 6 hours PRN  heparin  Injectable 3000 Unit(s) IV Push every 6 hours PRN      REVIEW OF SYSTEMS  --------------------------------------------------------------------------------  Gen: + fatigue/weakness chronically (years), no fevers/chills  Skin: No rashes  Respiratory: No dyspnea, cough, wheezing, hemoptysis  CV: No chest pain, PND, orthopnea, + palpitations, diaphoresis with AM episode  GI: No abdominal pain, decreased N/ no vomiting  : No increased frequency, dysuria, hematuria, nocturia  MSK: No joint pain/swelling; + shoulder and upper back pain with previous episode; + LE edema   Neuro: + dizziness/lightheadedness with episode      All other systems were reviewed and are negative, except as noted.      VITALS/PHYSICAL EXAM  --------------------------------------------------------------------------------  T(C): 36.9 (08-03-18 @ 04:47), Max: 37.1 (08-02-18 @ 13:41)  HR: 74 (08-03-18 @ 10:40) (66 - 74)  BP: 140/82 (08-03-18 @ 10:40) (100/74 - 146/69)  RR: 16 (08-03-18 @ 10:40) (16 - 20)  SpO2: 100% (08-03-18 @ 10:40) (98% - 100%)  Wt(kg): --  Height (cm): 162.56 (08-02-18 @ 21:32)  Weight (kg): 72.4 (08-02-18 @ 21:32)  BMI (kg/m2): 27.4 (08-02-18 @ 21:32)  BSA (m2): 1.78 (08-02-18 @ 21:32)      08-02-18 @ 07:01  -  08-03-18 @ 07:00  --------------------------------------------------------  IN: 186 mL / OUT: 0 mL / NET: 186 mL    08-03-18 @ 07:01  -  08-03-18 @ 11:51  --------------------------------------------------------  IN: 120 mL / OUT: 0 mL / NET: 120 mL      Physical Exam:  Gen: NAD, well-appearing  HEENT: PERRL, supple neck, clear oropharynx  Pulm: CTA B/L  CV: RRR, S1S2; no rub  Back: No spinal or CVA tenderness; no sacral edema  Abd: +BS, soft, nontender/nondistended  : No suprapubic tenderness  LE: Warm, FROM, no clubbing, intact strength; 1+ LE edema  Skin: Warm, without rashes      LABS/STUDIES  --------------------------------------------------------------------------------              8.9    6.01  >-----------<  224      [08-03-18 @ 04:51]              28.9     141  |  110  |  24  ----------------------------<  108      [08-03-18 @ 04:51]  4.9   |  18  |  3.36        Ca     8.7     [08-03-18 @ 04:51]      Mg     2.1     [08-03-18 @ 04:51]      Phos  3.9     [08-03-18 @ 04:51]    TPro  6.7  /  Alb  3.6  /  TBili  0.2  /  DBili  x   /  AST  13  /  ALT  7   /  AlkPhos  194  [08-03-18 @ 04:51]    PT/INR: PT 10.5 , INR 0.91       [08-02-18 @ 06:00]  PTT: > 200.0      [08-03-18 @ 04:51]      Creatinine Trend:  SCr 3.36 [08-03 @ 04:51]  SCr 3.48 [08-02 @ 06:00]    Urinalysis - [08-02-18 @ 06:00]      Color COLORL / Appearance CLEAR / SG 1.005 / pH 6.5      Gluc NEGATIVE / Ketone NEGATIVE  / Bili NEGATIVE / Urobili NORMAL       Blood NEGATIVE / Protein NEGATIVE / Leuk Est NEGATIVE / Nitrite NEGATIVE      RBC 0-2 / WBC 0-2 / Hyaline  / Gran  / Sq Epi OCC / Non Sq Epi  / Bacteria FEW    Urine Sodium 54      [08-02-18 @ 16:56]  Urine Potassium 23.0      [08-02-18 @ 16:56]  Urine Chloride 53      [08-02-18 @ 16:56]    HbA1c 5.8      [08-03-18 @ 04:51]  TSH 2.73      [08-02-18 @ 06:00]  Lipid: chol 126, TG 82, HDL 34, LDL 90      [08-03-18 @ 04:51]
Tha Sutton MD  Interventional Cardiology / Advance Heart Failure and Cardiac Transplant Specialist  [place] Office : [address] [place] [address] N. [address]  Tel:   [place] Office : [address] [facility] [place] [address]  Tel: [phone]  Cell : [phone]    Subjective : Pt lying in bed comfortable, not in distress, denies any chest pain or SOB, c/o urinary urgency and frequency denies dysuria  	  MEDICATIONS:  apixaban 5 milliGRAM(s) Oral every 12 hours  metoprolol tartrate 12.5 milliGRAM(s) Oral two times a day    cefTRIAXone   IVPB            docusate sodium 100 milliGRAM(s) Oral two times a day  pantoprazole    Tablet 40 milliGRAM(s) Oral before breakfast    atorvastatin 10 milliGRAM(s) Oral at bedtime    ergocalciferol 16793 Unit(s) Oral <User Schedule>  sodium bicarbonate 650 milliGRAM(s) Oral three times a day      PHYSICAL EXAM:  T(C): 37 (08-05-18 @ 18:18), Max: 37.3 (08-05-18 @ 13:38)  HR: 82 (08-05-18 @ 18:18) (70 - 82)  BP: 135/75 (08-05-18 @ 13:38) (130/60 - 139/67)  RR: 17 (08-05-18 @ 13:38) (16 - 17)  SpO2: 100% (08-05-18 @ 13:38) (98% - 100%)  Wt(kg): --  I&O's Summary    04 Aug 2018 07:01  -  05 Aug 2018 07:00  --------------------------------------------------------  IN: 200 mL / OUT: 0 mL / NET: 200 mL    05 Aug 2018 07:01  -  05 Aug 2018 19:03  --------------------------------------------------------  IN: 120 mL / OUT: 0 mL / NET: 120 mL          Appearance: Normal	  HEENT:   Normal oral mucosa, PERRL, EOMI	  Cardiovascular: Normal S1 S2, No JVD, No murmurs, No edema  Respiratory: Lungs clear to auscultation	  Gastrointestinal:  Soft, Non-tender, + BS	  Extremities: Normal range of motion, No clubbing, cyanosis or edema                                    10.1   9.36  )-----------( 233      ( 05 Aug 2018 08:08 )             31.9     08-05    135  |  104  |  23  ----------------------------<  108<H>  4.7   |  18<L>  |  3.75<H>    Ca    8.8      05 Aug 2018 08:08  Phos  2.6     08-05  Mg     1.9     08-05      proBNP:   Lipid Profile:   HgA1c:   TSH:
Tha Sutton MD  Interventional Cardiology / Advance Heart Failure and Cardiac Transplant Specialist  [place] Office : [address] [place] [address] N. [address]  Tel:   [place] Office : [address] [facility] [place] [address]  Tel: [phone]  Cell : [phone]    Subjective : Pt lying in bed comfortable, not in distress, denies any chest pain or SOB  	  MEDICATIONS:  apixaban 5 milliGRAM(s) Oral every 12 hours  metoprolol tartrate 12.5 milliGRAM(s) Oral two times a day  cefTRIAXone   IVPB      cefTRIAXone   IVPB 1 Gram(s) IV Intermittent every 24 hours  docusate sodium 100 milliGRAM(s) Oral two times a day  pantoprazole    Tablet 40 milliGRAM(s) Oral before breakfast  atorvastatin 10 milliGRAM(s) Oral at bedtime  ergocalciferol 49204 Unit(s) Oral <User Schedule>  sodium bicarbonate 650 milliGRAM(s) Oral three times a day    PHYSICAL EXAM:  T(C): 37.1 (08-06-18 @ 12:19), Max: 37.1 (08-05-18 @ 20:55)  HR: 65 (08-06-18 @ 12:19) (65 - 82)  BP: 134/81 (08-06-18 @ 12:19) (118/80 - 140/87)  RR: 18 (08-06-18 @ 12:19) (18 - 18)  SpO2: 100% (08-06-18 @ 12:19) (99% - 100%)  Wt(kg): --  I&O's Summary    05 Aug 2018 07:01  -  06 Aug 2018 07:00  --------------------------------------------------------  IN: 290 mL / OUT: 200 mL / NET: 90 mL    06 Aug 2018 07:01  -  06 Aug 2018 14:35  --------------------------------------------------------  IN: 240 mL / OUT: 0 mL / NET: 240 mL          Appearance: Normal	  HEENT:   Normal oral mucosa, PERRL, EOMI	  Cardiovascular: Normal S1 S2, No JVD, No murmurs, No edema  Respiratory: Lungs clear to auscultation	  Gastrointestinal:  Soft, Non-tender, + BS	  Extremities: Normal range of motion, No clubbing, cyanosis or edema                                    9.8    7.13  )-----------( 225      ( 06 Aug 2018 05:45 )             31.5     08-06    137  |  103  |  25<H>  ----------------------------<  96  4.7   |  18<L>  |  3.83<H>    Ca    8.9      06 Aug 2018 05:45  Phos  3.4     08-06  Mg     2.1     08-06      proBNP:   Lipid Profile:   HgA1c:   TSH:
Tha Sutton MD  Interventional Cardiology / Advance Heart Failure and Cardiac Transplant Specialist  [place] Office : [address] [place] [id number]  Tel:   [place] Office : [address] [facility] [address] [address]  Tel: [phone]  Cell : [phone]    Subjective : Pt lying in bed comfortable, not in distress, denies any chest pain or SOB  	  MEDICATIONS:  apixaban 5 milliGRAM(s) Oral every 12 hours  docusate sodium 100 milliGRAM(s) Oral two times a day  pantoprazole    Tablet 40 milliGRAM(s) Oral before breakfast  atorvastatin 10 milliGRAM(s) Oral at bedtime  sodium bicarbonate 650 milliGRAM(s) Oral three times a day      PHYSICAL EXAM:  T(C): 36.4 (08-04-18 @ 13:19), Max: 36.8 (08-04-18 @ 05:40)  HR: 68 (08-04-18 @ 13:19) (68 - 79)  BP: 157/88 (08-04-18 @ 13:19) (122/564 - 157/88)  RR: 18 (08-04-18 @ 13:19) (16 - 18)  SpO2: 100% (08-04-18 @ 13:19) (100% - 100%)  Wt(kg): --  I&O's Summary    03 Aug 2018 07:01  -  04 Aug 2018 07:00  --------------------------------------------------------  IN: 534 mL / OUT: 0 mL / NET: 534 mL          Appearance: Normal	  HEENT:   Normal oral mucosa, PERRL, EOMI	  Cardiovascular: Normal S1 S2, No JVD, No murmurs, No edema  Respiratory: Lungs clear to auscultation	  Gastrointestinal:  Soft, Non-tender, + BS	  Extremities: Normal range of motion, No clubbing, cyanosis or edema                                    9.6    5.16  )-----------( 231      ( 04 Aug 2018 04:00 )             32.1     08-04    140  |  108<H>  |  25<H>  ----------------------------<  98  4.8   |  17<L>  |  3.47<H>    Ca    9.1      04 Aug 2018 04:00  Phos  3.2     08-04  Mg     2.1     08-04    TPro  6.7  /  Alb  3.6  /  TBili  0.2  /  DBili  x   /  AST  13  /  ALT  7   /  AlkPhos  194<H>  08-03    proBNP:   Lipid Profile:   HgA1c:   TSH:

## 2018-08-08 LAB
-  AMIKACIN: SIGNIFICANT CHANGE UP
-  AMPICILLIN/SULBACTAM: SIGNIFICANT CHANGE UP
-  AMPICILLIN: SIGNIFICANT CHANGE UP
-  AZTREONAM: SIGNIFICANT CHANGE UP
-  CEFAZOLIN: SIGNIFICANT CHANGE UP
-  CEFEPIME: SIGNIFICANT CHANGE UP
-  CEFOXITIN: SIGNIFICANT CHANGE UP
-  CEFTAZIDIME: SIGNIFICANT CHANGE UP
-  CEFTRIAXONE: SIGNIFICANT CHANGE UP
-  CEFUROXIME: SIGNIFICANT CHANGE UP
-  CIPROFLOXACIN: SIGNIFICANT CHANGE UP
-  ERTAPENEM: SIGNIFICANT CHANGE UP
-  GENTAMICIN: SIGNIFICANT CHANGE UP
-  IMIPENEM: SIGNIFICANT CHANGE UP
-  LEVOFLOXACIN: SIGNIFICANT CHANGE UP
-  MEROPENEM: SIGNIFICANT CHANGE UP
-  NITROFURANTOIN: SIGNIFICANT CHANGE UP
-  PIPERACILLIN/TAZOBACTAM: SIGNIFICANT CHANGE UP
-  TIGECYCLINE: SIGNIFICANT CHANGE UP
-  TOBRAMYCIN: SIGNIFICANT CHANGE UP
-  TRIMETHOPRIM/SULFAMETHOXAZOLE: SIGNIFICANT CHANGE UP
BACTERIA UR CULT: SIGNIFICANT CHANGE UP
METHOD TYPE: SIGNIFICANT CHANGE UP
ORGANISM # SPEC MICROSCOPIC CNT: SIGNIFICANT CHANGE UP
ORGANISM # SPEC MICROSCOPIC CNT: SIGNIFICANT CHANGE UP

## 2018-10-05 ENCOUNTER — APPOINTMENT (OUTPATIENT)
Dept: ORTHOPEDIC SURGERY | Facility: CLINIC | Age: 68
End: 2018-10-05
Payer: COMMERCIAL

## 2018-10-05 VITALS
BODY MASS INDEX: 28.34 KG/M2 | SYSTOLIC BLOOD PRESSURE: 143 MMHG | HEART RATE: 69 BPM | DIASTOLIC BLOOD PRESSURE: 84 MMHG | HEIGHT: 64 IN | WEIGHT: 166 LBS

## 2018-10-05 DIAGNOSIS — Z87.448 PERSONAL HISTORY OF OTHER DISEASES OF URINARY SYSTEM: ICD-10-CM

## 2018-10-05 DIAGNOSIS — Z56.0 UNEMPLOYMENT, UNSPECIFIED: ICD-10-CM

## 2018-10-05 DIAGNOSIS — Z78.9 OTHER SPECIFIED HEALTH STATUS: ICD-10-CM

## 2018-10-05 DIAGNOSIS — Z86.79 PERSONAL HISTORY OF OTHER DISEASES OF THE CIRCULATORY SYSTEM: ICD-10-CM

## 2018-10-05 DIAGNOSIS — S90.32XA CONTUSION OF LEFT FOOT, INITIAL ENCOUNTER: ICD-10-CM

## 2018-10-05 DIAGNOSIS — Z80.3 FAMILY HISTORY OF MALIGNANT NEOPLASM OF BREAST: ICD-10-CM

## 2018-10-05 DIAGNOSIS — Z86.39 PERSONAL HISTORY OF OTHER ENDOCRINE, NUTRITIONAL AND METABOLIC DISEASE: ICD-10-CM

## 2018-10-05 PROCEDURE — 99203 OFFICE O/P NEW LOW 30 MIN: CPT

## 2018-10-05 PROCEDURE — 73630 X-RAY EXAM OF FOOT: CPT | Mod: LT

## 2018-10-05 RX ORDER — SODIUM BICARBONATE 650 MG/1
650 TABLET ORAL
Refills: 0 | Status: ACTIVE | COMMUNITY

## 2018-10-05 RX ORDER — APIXABAN 2.5 MG/1
2.5 TABLET, FILM COATED ORAL
Refills: 0 | Status: ACTIVE | COMMUNITY

## 2018-10-05 RX ORDER — ATORVASTATIN CALCIUM 10 MG/1
10 TABLET, FILM COATED ORAL
Refills: 0 | Status: ACTIVE | COMMUNITY

## 2018-10-05 RX ORDER — METOPROLOL TARTRATE 75 MG/1
TABLET, FILM COATED ORAL
Refills: 0 | Status: ACTIVE | COMMUNITY

## 2018-10-05 RX ORDER — OMEPRAZOLE 20 MG/1
20 CAPSULE, DELAYED RELEASE ORAL
Refills: 0 | Status: ACTIVE | COMMUNITY

## 2018-10-05 SDOH — ECONOMIC STABILITY - INCOME SECURITY: UNEMPLOYMENT, UNSPECIFIED: Z56.0

## 2018-10-09 PROBLEM — S90.32XA CONTUSION OF LEFT FOOT, INITIAL ENCOUNTER: Status: ACTIVE | Noted: 2018-10-05

## 2018-12-06 ENCOUNTER — INPATIENT (INPATIENT)
Facility: HOSPITAL | Age: 68
LOS: 1 days | Discharge: HOME | End: 2018-12-08
Attending: LEGAL MEDICINE | Admitting: LEGAL MEDICINE
Payer: COMMERCIAL

## 2018-12-06 VITALS
DIASTOLIC BLOOD PRESSURE: 70 MMHG | SYSTOLIC BLOOD PRESSURE: 134 MMHG | TEMPERATURE: 98 F | HEART RATE: 64 BPM | RESPIRATION RATE: 16 BRPM | OXYGEN SATURATION: 100 %

## 2018-12-06 DIAGNOSIS — E87.2 ACIDOSIS: ICD-10-CM

## 2018-12-06 DIAGNOSIS — R10.9 UNSPECIFIED ABDOMINAL PAIN: ICD-10-CM

## 2018-12-06 DIAGNOSIS — I48.91 UNSPECIFIED ATRIAL FIBRILLATION: ICD-10-CM

## 2018-12-06 DIAGNOSIS — Z29.9 ENCOUNTER FOR PROPHYLACTIC MEASURES, UNSPECIFIED: ICD-10-CM

## 2018-12-06 DIAGNOSIS — I10 ESSENTIAL (PRIMARY) HYPERTENSION: ICD-10-CM

## 2018-12-06 DIAGNOSIS — I24.9 ACUTE ISCHEMIC HEART DISEASE, UNSPECIFIED: ICD-10-CM

## 2018-12-06 DIAGNOSIS — Z98.49 CATARACT EXTRACTION STATUS, UNSPECIFIED EYE: Chronic | ICD-10-CM

## 2018-12-06 DIAGNOSIS — E78.5 HYPERLIPIDEMIA, UNSPECIFIED: ICD-10-CM

## 2018-12-06 DIAGNOSIS — D64.9 ANEMIA, UNSPECIFIED: ICD-10-CM

## 2018-12-06 LAB
ALBUMIN SERPL ELPH-MCNC: 3.9 G/DL — SIGNIFICANT CHANGE UP (ref 3.3–5)
ALP SERPL-CCNC: 243 U/L — HIGH (ref 40–120)
ALT FLD-CCNC: 7 U/L — SIGNIFICANT CHANGE UP (ref 4–33)
APPEARANCE UR: CLEAR — SIGNIFICANT CHANGE UP
APTT BLD: 30.9 SEC — SIGNIFICANT CHANGE UP (ref 27.5–36.3)
AST SERPL-CCNC: 11 U/L — SIGNIFICANT CHANGE UP (ref 4–32)
BASE EXCESS BLDV CALC-SCNC: -6.5 MMOL/L — SIGNIFICANT CHANGE UP
BASE EXCESS BLDV CALC-SCNC: -7.3 MMOL/L — SIGNIFICANT CHANGE UP
BASOPHILS # BLD AUTO: 0.02 K/UL — SIGNIFICANT CHANGE UP (ref 0–0.2)
BASOPHILS NFR BLD AUTO: 0.3 % — SIGNIFICANT CHANGE UP (ref 0–2)
BILIRUB SERPL-MCNC: 0.3 MG/DL — SIGNIFICANT CHANGE UP (ref 0.2–1.2)
BILIRUB UR-MCNC: NEGATIVE — SIGNIFICANT CHANGE UP
BLOOD GAS VENOUS - CREATININE: 3.64 MG/DL — HIGH (ref 0.5–1.3)
BLOOD GAS VENOUS - CREATININE: 3.74 MG/DL — HIGH (ref 0.5–1.3)
BLOOD UR QL VISUAL: NEGATIVE — SIGNIFICANT CHANGE UP
BUN SERPL-MCNC: 26 MG/DL — HIGH (ref 7–23)
CALCIUM SERPL-MCNC: 9.1 MG/DL — SIGNIFICANT CHANGE UP (ref 8.4–10.5)
CHLORIDE BLDV-SCNC: 113 MMOL/L — HIGH (ref 96–108)
CHLORIDE BLDV-SCNC: 114 MMOL/L — HIGH (ref 96–108)
CHLORIDE SERPL-SCNC: 109 MMOL/L — HIGH (ref 98–107)
CK MB BLD-MCNC: 1.33 NG/ML — SIGNIFICANT CHANGE UP (ref 1–4.7)
CK SERPL-CCNC: 40 U/L — SIGNIFICANT CHANGE UP (ref 25–170)
CO2 SERPL-SCNC: 18 MMOL/L — LOW (ref 22–31)
COLOR SPEC: COLORLESS — SIGNIFICANT CHANGE UP
CREAT SERPL-MCNC: 3.71 MG/DL — HIGH (ref 0.5–1.3)
EOSINOPHIL # BLD AUTO: 0.13 K/UL — SIGNIFICANT CHANGE UP (ref 0–0.5)
EOSINOPHIL NFR BLD AUTO: 2.1 % — SIGNIFICANT CHANGE UP (ref 0–6)
GAS PNL BLDV: 136 MMOL/L — SIGNIFICANT CHANGE UP (ref 136–146)
GAS PNL BLDV: 139 MMOL/L — SIGNIFICANT CHANGE UP (ref 136–146)
GLUCOSE BLDV-MCNC: 112 — HIGH (ref 70–99)
GLUCOSE BLDV-MCNC: 132 — HIGH (ref 70–99)
GLUCOSE SERPL-MCNC: 109 MG/DL — HIGH (ref 70–99)
GLUCOSE UR-MCNC: NEGATIVE — SIGNIFICANT CHANGE UP
HCO3 BLDV-SCNC: 18 MMOL/L — LOW (ref 20–27)
HCO3 BLDV-SCNC: 18 MMOL/L — LOW (ref 20–27)
HCT VFR BLD CALC: 32.1 % — LOW (ref 34.5–45)
HCT VFR BLDV CALC: 28.6 % — LOW (ref 34.5–45)
HCT VFR BLDV CALC: 29.5 % — LOW (ref 34.5–45)
HGB BLD-MCNC: 9.7 G/DL — LOW (ref 11.5–15.5)
HGB BLDV-MCNC: 9.2 G/DL — LOW (ref 11.5–15.5)
HGB BLDV-MCNC: 9.5 G/DL — LOW (ref 11.5–15.5)
IMM GRANULOCYTES # BLD AUTO: 0.03 # — SIGNIFICANT CHANGE UP
IMM GRANULOCYTES NFR BLD AUTO: 0.5 % — SIGNIFICANT CHANGE UP (ref 0–1.5)
INR BLD: 1.1 — SIGNIFICANT CHANGE UP (ref 0.88–1.17)
KETONES UR-MCNC: NEGATIVE — SIGNIFICANT CHANGE UP
LACTATE BLDV-MCNC: 1.2 MMOL/L — SIGNIFICANT CHANGE UP (ref 0.5–2)
LACTATE BLDV-MCNC: 1.2 MMOL/L — SIGNIFICANT CHANGE UP (ref 0.5–2)
LEUKOCYTE ESTERASE UR-ACNC: NEGATIVE — SIGNIFICANT CHANGE UP
LIDOCAIN IGE QN: 72.6 U/L — HIGH (ref 7–60)
LYMPHOCYTES # BLD AUTO: 1.16 K/UL — SIGNIFICANT CHANGE UP (ref 1–3.3)
LYMPHOCYTES # BLD AUTO: 19.1 % — SIGNIFICANT CHANGE UP (ref 13–44)
MCHC RBC-ENTMCNC: 25.9 PG — LOW (ref 27–34)
MCHC RBC-ENTMCNC: 30.2 % — LOW (ref 32–36)
MCV RBC AUTO: 85.8 FL — SIGNIFICANT CHANGE UP (ref 80–100)
MONOCYTES # BLD AUTO: 0.45 K/UL — SIGNIFICANT CHANGE UP (ref 0–0.9)
MONOCYTES NFR BLD AUTO: 7.4 % — SIGNIFICANT CHANGE UP (ref 2–14)
NEUTROPHILS # BLD AUTO: 4.29 K/UL — SIGNIFICANT CHANGE UP (ref 1.8–7.4)
NEUTROPHILS NFR BLD AUTO: 70.6 % — SIGNIFICANT CHANGE UP (ref 43–77)
NITRITE UR-MCNC: NEGATIVE — SIGNIFICANT CHANGE UP
NRBC # FLD: 0 — SIGNIFICANT CHANGE UP
NT-PROBNP SERPL-SCNC: 985.1 PG/ML — SIGNIFICANT CHANGE UP
PCO2 BLDV: 41 MMHG — SIGNIFICANT CHANGE UP (ref 41–51)
PCO2 BLDV: 44 MMHG — SIGNIFICANT CHANGE UP (ref 41–51)
PH BLDV: 7.25 PH — LOW (ref 7.32–7.43)
PH BLDV: 7.28 PH — LOW (ref 7.32–7.43)
PH UR: 6.5 — SIGNIFICANT CHANGE UP (ref 5–8)
PLATELET # BLD AUTO: 261 K/UL — SIGNIFICANT CHANGE UP (ref 150–400)
PMV BLD: 10.6 FL — SIGNIFICANT CHANGE UP (ref 7–13)
PO2 BLDV: 28 MMHG — LOW (ref 35–40)
PO2 BLDV: 30 MMHG — LOW (ref 35–40)
POTASSIUM BLDV-SCNC: 4.1 MMOL/L — SIGNIFICANT CHANGE UP (ref 3.4–4.5)
POTASSIUM BLDV-SCNC: 4.5 MMOL/L — SIGNIFICANT CHANGE UP (ref 3.4–4.5)
POTASSIUM SERPL-MCNC: 4.7 MMOL/L — SIGNIFICANT CHANGE UP (ref 3.5–5.3)
POTASSIUM SERPL-SCNC: 4.7 MMOL/L — SIGNIFICANT CHANGE UP (ref 3.5–5.3)
PROT SERPL-MCNC: 7.6 G/DL — SIGNIFICANT CHANGE UP (ref 6–8.3)
PROT UR-MCNC: NEGATIVE — SIGNIFICANT CHANGE UP
PROTHROM AB SERPL-ACNC: 12.2 SEC — SIGNIFICANT CHANGE UP (ref 9.8–13.1)
RBC # BLD: 3.74 M/UL — LOW (ref 3.8–5.2)
RBC # FLD: 13.4 % — SIGNIFICANT CHANGE UP (ref 10.3–14.5)
SAO2 % BLDV: 42.5 % — LOW (ref 60–85)
SAO2 % BLDV: 50.1 % — LOW (ref 60–85)
SODIUM SERPL-SCNC: 140 MMOL/L — SIGNIFICANT CHANGE UP (ref 135–145)
SP GR SPEC: 1.01 — SIGNIFICANT CHANGE UP (ref 1–1.04)
TROPONIN T, HIGH SENSITIVITY: 19 NG/L — SIGNIFICANT CHANGE UP (ref ?–14)
TROPONIN T, HIGH SENSITIVITY: 21 NG/L — SIGNIFICANT CHANGE UP (ref ?–14)
TROPONIN T, HIGH SENSITIVITY: 25 NG/L — SIGNIFICANT CHANGE UP (ref ?–14)
UROBILINOGEN FLD QL: NORMAL — SIGNIFICANT CHANGE UP
WBC # BLD: 6.08 K/UL — SIGNIFICANT CHANGE UP (ref 3.8–10.5)
WBC # FLD AUTO: 6.08 K/UL — SIGNIFICANT CHANGE UP (ref 3.8–10.5)

## 2018-12-06 PROCEDURE — 71045 X-RAY EXAM CHEST 1 VIEW: CPT | Mod: 26

## 2018-12-06 RX ORDER — DOCUSATE SODIUM 100 MG
1 CAPSULE ORAL
Qty: 0 | Refills: 0 | COMMUNITY

## 2018-12-06 RX ORDER — ASPIRIN/CALCIUM CARB/MAGNESIUM 324 MG
81 TABLET ORAL DAILY
Qty: 0 | Refills: 0 | Status: DISCONTINUED | OUTPATIENT
Start: 2018-12-06 | End: 2018-12-08

## 2018-12-06 RX ORDER — ASPIRIN/CALCIUM CARB/MAGNESIUM 324 MG
162 TABLET ORAL DAILY
Qty: 0 | Refills: 0 | Status: DISCONTINUED | OUTPATIENT
Start: 2018-12-06 | End: 2018-12-06

## 2018-12-06 RX ORDER — METOPROLOL TARTRATE 50 MG
12.5 TABLET ORAL ONCE
Qty: 0 | Refills: 0 | Status: COMPLETED | OUTPATIENT
Start: 2018-12-06 | End: 2018-12-06

## 2018-12-06 RX ORDER — METOPROLOL TARTRATE 50 MG
12.5 TABLET ORAL
Qty: 0 | Refills: 0 | Status: DISCONTINUED | OUTPATIENT
Start: 2018-12-06 | End: 2018-12-08

## 2018-12-06 RX ORDER — SODIUM BICARBONATE 1 MEQ/ML
1 SYRINGE (ML) INTRAVENOUS
Qty: 0 | Refills: 0 | COMMUNITY

## 2018-12-06 RX ORDER — ATORVASTATIN CALCIUM 80 MG/1
10 TABLET, FILM COATED ORAL AT BEDTIME
Qty: 0 | Refills: 0 | Status: DISCONTINUED | OUTPATIENT
Start: 2018-12-06 | End: 2018-12-08

## 2018-12-06 RX ORDER — ONDANSETRON 8 MG/1
4 TABLET, FILM COATED ORAL ONCE
Qty: 0 | Refills: 0 | Status: COMPLETED | OUTPATIENT
Start: 2018-12-06 | End: 2018-12-06

## 2018-12-06 RX ORDER — OMEPRAZOLE 10 MG/1
1 CAPSULE, DELAYED RELEASE ORAL
Qty: 0 | Refills: 0 | COMMUNITY

## 2018-12-06 RX ORDER — APIXABAN 2.5 MG/1
2.5 TABLET, FILM COATED ORAL EVERY 12 HOURS
Qty: 0 | Refills: 0 | Status: DISCONTINUED | OUTPATIENT
Start: 2018-12-06 | End: 2018-12-08

## 2018-12-06 RX ORDER — SODIUM CHLORIDE 9 MG/ML
1000 INJECTION, SOLUTION INTRAVENOUS ONCE
Qty: 0 | Refills: 0 | Status: COMPLETED | OUTPATIENT
Start: 2018-12-06 | End: 2018-12-06

## 2018-12-06 RX ORDER — APIXABAN 2.5 MG/1
2.5 TABLET, FILM COATED ORAL ONCE
Qty: 0 | Refills: 0 | Status: COMPLETED | OUTPATIENT
Start: 2018-12-06 | End: 2018-12-06

## 2018-12-06 RX ORDER — SODIUM BICARBONATE 1 MEQ/ML
650 SYRINGE (ML) INTRAVENOUS THREE TIMES A DAY
Qty: 0 | Refills: 0 | Status: DISCONTINUED | OUTPATIENT
Start: 2018-12-06 | End: 2018-12-08

## 2018-12-06 RX ORDER — PANTOPRAZOLE SODIUM 20 MG/1
40 TABLET, DELAYED RELEASE ORAL
Qty: 0 | Refills: 0 | Status: DISCONTINUED | OUTPATIENT
Start: 2018-12-06 | End: 2018-12-08

## 2018-12-06 RX ORDER — ATORVASTATIN CALCIUM 80 MG/1
1 TABLET, FILM COATED ORAL
Qty: 0 | Refills: 0 | COMMUNITY

## 2018-12-06 RX ADMIN — Medication 162 MILLIGRAM(S): at 11:29

## 2018-12-06 RX ADMIN — SODIUM CHLORIDE 1000 MILLILITER(S): 9 INJECTION, SOLUTION INTRAVENOUS at 13:04

## 2018-12-06 RX ADMIN — Medication 650 MILLIGRAM(S): at 22:25

## 2018-12-06 RX ADMIN — ONDANSETRON 4 MILLIGRAM(S): 8 TABLET, FILM COATED ORAL at 11:30

## 2018-12-06 RX ADMIN — ATORVASTATIN CALCIUM 10 MILLIGRAM(S): 80 TABLET, FILM COATED ORAL at 22:25

## 2018-12-06 RX ADMIN — SODIUM CHLORIDE 1333.33 MILLILITER(S): 9 INJECTION, SOLUTION INTRAVENOUS at 11:15

## 2018-12-06 RX ADMIN — Medication 30 MILLILITER(S): at 11:29

## 2018-12-06 RX ADMIN — APIXABAN 2.5 MILLIGRAM(S): 2.5 TABLET, FILM COATED ORAL at 11:29

## 2018-12-06 RX ADMIN — Medication 12.5 MILLIGRAM(S): at 11:15

## 2018-12-06 NOTE — H&P ADULT - PROBLEM SELECTOR PLAN 2
Risk Factor                                                        Points    [ ] Congestive Heart Failure			1  [ x] Hypertension					1  [ ] Age = 75y					2  [ x] Age 65-74y					1  [ ] Diabetes Mellitus				1  [ ] Stroke/TIA (ie, thromboembolic)		2  [ ] Vascular Disease (MI/PAD/aortic plaque)	1  [x ] Sex Category (ie, female sex)			1    Total Score = 3  Continue with eliquis. Patient currently rate controlled. Continue with metoprolol.

## 2018-12-06 NOTE — H&P ADULT - ASSESSMENT
68 y/o F with h/o a. fib on eliquis, CKD stage 4, anemia, HLD, HTN, presents to the Ed for nausea, epigastric pain, dizziness. Admit to telemetry to r/o ACS.

## 2018-12-06 NOTE — H&P ADULT - PROBLEM SELECTOR PLAN 1
Admit to telemetry.   Trend CE. EKG PRN chest pain.   TTE ordered. NST ordered.  f/u cards consult with Dr. Sutton.   check cbc,tsh,lipid, hemoglobin a1c, bmp with mag and phos.  f/u MD note

## 2018-12-06 NOTE — ED PROVIDER NOTE - NS ED ROS FT
General: No fevers / chills  HENT: No ear pain, runny nose, or sore throat  Eyes: No visual changes  CP: +chest pain, no palpitations, +light headedness  Resp: No shortness of breath, no cough  GI: +abdominal pain, +nausea, +vomiting  : No dysuria or hematuria  Neuro: No numbness, tingling, or weakness  Endo: No hx of diabetes  Heme: No hx of easy bleeding or bruising

## 2018-12-06 NOTE — H&P ADULT - ATTENDING COMMENTS
MD Note-Patient seen and examined.Agree with above H and P.Cardiology,Renal consult.ECHO.Possible stress test MD Note-Patient seen and examined.Agree with above H and P.Cardiology,Renal consult.ECHO.Possible stress test  Elevated lipase,alk.phosphatase level-monitor GI Consult

## 2018-12-06 NOTE — ED ADULT NURSE NOTE - CHIEF COMPLAINT QUOTE
states" Feeling nauseous , pale and vomited once with dizziness today". h/o Afib on Eliquis . denies cp h/o HTN, Cardiac ESRD not on dialysis.  in triage

## 2018-12-06 NOTE — ED ADULT TRIAGE NOTE - CHIEF COMPLAINT QUOTE
states" Feeling nauseous , pale and vomited once with dizziness today". h/o Afib on Eliquis . denies cp h/o HTN, Cardiac.  in triage states" Feeling nauseous , pale and vomited once with dizziness today". h/o Afib on Eliquis . denies cp h/o HTN, Cardiac ESRD not on dialysis.  in triage

## 2018-12-06 NOTE — H&P ADULT - NSHPREVIEWOFSYSTEMS_GEN_ALL_CORE
Constitutional: No fever, fatigue or weight loss.  Skin: No rash.  Eyes: No recent vision problems or eye pain.  ENT: No congestion, ear pain, or sore throat.  Endocrine: No thyroid problems.  Cardiovascular: No chest pain or palpitation.  Respiratory: No cough, shortness of breath, congestion, or wheezing.  Gastrointestinal: + nausea, + vomiting, + epigastric pain. No diarrhea.  Genitourinary: No dysuria.  Musculoskeletal: No joint swelling.  Neurologic: No headache. + dizziness

## 2018-12-06 NOTE — ED ADULT NURSE NOTE - BMI (KG/M2)
How Severe Are Your Bumps?: moderate Have Your Bumps Been Treated?: not been treated Is This A New Presentation, Or A Follow-Up?: Bumps Additional History: She says the bumps are itchy and get worse when she is sweating. \\n\\nShe has used hydrocortisone. 28.5

## 2018-12-06 NOTE — ED PROVIDER NOTE - ATTENDING CONTRIBUTION TO CARE
67F h/o afib on eliquis, htn, ckd presents from home with family for episode of abdominal discomfort, nausea, vomiting. Patient feeling better now. States had similar symptoms when she had afib few months ago. Patient denies headache, vision changes, focal weakness/numbness, neck pain, fever, cough, chest pain, shortness of breath, back pain, diarrhea, constipation, blood in stool, dysuria, rash, trauma, falls. Patient is well appearing, conversant, cooperative, smiling, head atraumatic, neck supple with full range of motion, oropharynx clear, lungs clear, no crackles or rales, speaking full sentences, heart clear, no murmurs, distal pulses equal in all 4 extremities, abdomen soft nontender nondistended with no masses, no leg edema, no calf tenderness, nonfocal neurologic exam. No acute signs of trauma, infection, sepsis, toxidrome, intoxication.     The patient had repeat abdominal examinations.  They did not show peritoneal signs.  There were no signs of ischemic bowel, AAA, or perforations. No evidence of Appendicitis, Diverticulitis, Bowel Obstruction, MI, Torsion, acute biliary tract disease or any other acute abdominal condition.     The patient's risk factors for ACS were reviewed as well as the EKG.  The CXR assists in r/o Pneumonia, Pneumothorax, Esophageal Tears.  The patient does not appear to have a Pulmonary Embolism based on the Wells Score and there is no apparent DVT.  There are no signs of Pericarditis, Endocarditis, or Myocarditis based on risk factor analysis.  There is no fever.  There does not appear to be an Aortic Dissection either based on history, physical exam, and signs. Ekg, troponin, cardiac monitor, aspirin, reassess.

## 2018-12-06 NOTE — ED PROVIDER NOTE - MEDICAL DECISION MAKING DETAILS
67F hx afib and ckd presenting with epigastric pain, on exam is ttp over epigastrium but otherwise without focal findings. Will eval with labs including cardiac markers and lipase / blood gas, ekg taken in triage is nsr, follow up studies, reassess, dispo.

## 2018-12-06 NOTE — H&P ADULT - NSHPLABSRESULTS_GEN_ALL_CORE
LABS:                        9.7    6.08  )-----------( 261      ( 06 Dec 2018 11:00 )             32.1     12-    140  |  109<H>  |  26<H>  ----------------------------<  109<H>  4.7   |  18<L>  |  3.71<H>    Ca    9.1      06 Dec 2018 11:00    TPro  7.6  /  Alb  3.9  /  TBili  0.3  /  DBili  x   /  AST  11  /  ALT  7   /  AlkPhos  243<H>  12-06    PT/INR - ( 06 Dec 2018 11:00 )   PT: 12.2 SEC;   INR: 1.10          PTT - ( 06 Dec 2018 11:00 )  PTT:30.9 SEC  Urinalysis Basic - ( 06 Dec 2018 17:45 )    Color: COLORLESS / Appearance: CLEAR / S.006 / pH: 6.5  Gluc: NEGATIVE / Ketone: NEGATIVE  / Bili: NEGATIVE / Urobili: NORMAL   Blood: NEGATIVE / Protein: NEGATIVE / Nitrite: NEGATIVE   Leuk Esterase: NEGATIVE / RBC: x / WBC x   Sq Epi: x / Non Sq Epi: x / Bacteria: x      CAPILLARY BLOOD GLUCOSE      POCT Blood Glucose.: 105 mg/dL (06 Dec 2018 10:34)        Urinalysis Basic - ( 06 Dec 2018 17:45 )    Color: COLORLESS / Appearance: CLEAR / S.006 / pH: 6.5  Gluc: NEGATIVE / Ketone: NEGATIVE  / Bili: NEGATIVE / Urobili: NORMAL   Blood: NEGATIVE / Protein: NEGATIVE / Nitrite: NEGATIVE   Leuk Esterase: NEGATIVE / RBC: x / WBC x   Sq Epi: x / Non Sq Epi: x / Bacteria: x      EKG shows NSR @ 60 bpm T wave flattening in III. V1

## 2018-12-06 NOTE — ED ADULT NURSE REASSESSMENT NOTE - NS ED NURSE REASSESS COMMENT FT1
received report from china VAZQUEZ. Pt is a/o x 3. No complaints of chest pain, headache, nausea, dizziness, vomiting  SOB, fever, chills verbalized. Pt given medications as per order. Labs drawn and sent. Pt has 20g to left AC with no redness or swelling noted. pt on cardiac monitor in NSR. awaiting further orders will continue to [facility]

## 2018-12-06 NOTE — ED PROVIDER NOTE - CARE PLAN
Principal Discharge DX:	Abdominal pain Principal Discharge DX:	Abdominal pain  Secondary Diagnosis:	Nausea & vomiting  Secondary Diagnosis:	Diaphoresis

## 2018-12-06 NOTE — H&P ADULT - HISTORY OF PRESENT ILLNESS
66 y/o F with h/o a. fib on eliquis and metoprolol, CKD stage 4, anemia, HLD, HTN presents to the ED for nausea and epigastric pain. Pt states she was at home this morning around 9 am and she was cooking and eating in the kitchen when she had a sudden onset of nausea and epigastric pain. Pt states she became dizzy and vomited once,NBNB . Pt then told her daughter take her to the ER. Pt states she is currently back to baseline. Pt denies LOC, syncope, fever, chills, numbness, tingling, chest pain, shortness of breath, palpitations, headache, visual/hearing changes, dysuria, urinary/bowel incontinence or any other complaints at this time.

## 2018-12-06 NOTE — ED PROVIDER NOTE - PHYSICAL EXAMINATION
Gen: NAD, non-toxic, conversational  Eyes: PERRLA, EOMI   HENT: Normocephalic, atraumatic. External ears normal, no rhinorrhea, dry mucous membranes.   CV: RRR, no M/R/G  Resp: CTAB, non-labored, speaking without difficulty on room air  Abd: soft, tender to palpation over the epigastrium, non rigid, no distention, no guarding or rebound tenderness  Back: No CVAT bilaterally, no midline ttp  Skin: dry, wwp   Neuro: AOx3, speech is fluent and appropriate  Psych: Mood good, affect euthymic

## 2018-12-06 NOTE — ED PROVIDER NOTE - PROGRESS NOTE DETAILS
Discussed troponin values with the family who report they would prefer hospitalization for further investigation, will admit for stress / echo with Dr. Tha Sutton.

## 2018-12-07 ENCOUNTER — TRANSCRIPTION ENCOUNTER (OUTPATIENT)
Age: 68
End: 2018-12-07

## 2018-12-07 VITALS
SYSTOLIC BLOOD PRESSURE: 151 MMHG | HEART RATE: 61 BPM | OXYGEN SATURATION: 100 % | DIASTOLIC BLOOD PRESSURE: 83 MMHG | TEMPERATURE: 98 F | RESPIRATION RATE: 18 BRPM

## 2018-12-07 DIAGNOSIS — N18.5 CHRONIC KIDNEY DISEASE, STAGE 5: ICD-10-CM

## 2018-12-07 LAB
BUN SERPL-MCNC: 26 MG/DL — HIGH (ref 7–23)
CALCIUM SERPL-MCNC: 8.7 MG/DL — SIGNIFICANT CHANGE UP (ref 8.4–10.5)
CHLORIDE SERPL-SCNC: 112 MMOL/L — HIGH (ref 98–107)
CHOLEST SERPL-MCNC: 138 MG/DL — SIGNIFICANT CHANGE UP (ref 120–199)
CO2 SERPL-SCNC: 18 MMOL/L — LOW (ref 22–31)
CREAT SERPL-MCNC: 3.6 MG/DL — HIGH (ref 0.5–1.3)
GLUCOSE SERPL-MCNC: 103 MG/DL — HIGH (ref 70–99)
HBA1C BLD-MCNC: 5.9 % — HIGH (ref 4–5.6)
HCT VFR BLD CALC: 29.2 % — LOW (ref 34.5–45)
HDLC SERPL-MCNC: 26 MG/DL — LOW (ref 45–65)
HGB BLD-MCNC: 8.8 G/DL — LOW (ref 11.5–15.5)
LIPID PNL WITH DIRECT LDL SERPL: 86 MG/DL — SIGNIFICANT CHANGE UP
MAGNESIUM SERPL-MCNC: 2.2 MG/DL — SIGNIFICANT CHANGE UP (ref 1.6–2.6)
MCHC RBC-ENTMCNC: 25.8 PG — LOW (ref 27–34)
MCHC RBC-ENTMCNC: 30.1 % — LOW (ref 32–36)
MCV RBC AUTO: 85.6 FL — SIGNIFICANT CHANGE UP (ref 80–100)
NRBC # FLD: 0 — SIGNIFICANT CHANGE UP
PHOSPHATE SERPL-MCNC: 3.4 MG/DL — SIGNIFICANT CHANGE UP (ref 2.5–4.5)
PLATELET # BLD AUTO: 229 K/UL — SIGNIFICANT CHANGE UP (ref 150–400)
PMV BLD: 10.5 FL — SIGNIFICANT CHANGE UP (ref 7–13)
POTASSIUM SERPL-MCNC: 5 MMOL/L — SIGNIFICANT CHANGE UP (ref 3.5–5.3)
POTASSIUM SERPL-SCNC: 5 MMOL/L — SIGNIFICANT CHANGE UP (ref 3.5–5.3)
RBC # BLD: 3.41 M/UL — LOW (ref 3.8–5.2)
RBC # FLD: 13.7 % — SIGNIFICANT CHANGE UP (ref 10.3–14.5)
SODIUM SERPL-SCNC: 142 MMOL/L — SIGNIFICANT CHANGE UP (ref 135–145)
TRIGL SERPL-MCNC: 144 MG/DL — SIGNIFICANT CHANGE UP (ref 10–149)
TSH SERPL-MCNC: 1.33 UIU/ML — SIGNIFICANT CHANGE UP (ref 0.27–4.2)
WBC # BLD: 4.88 K/UL — SIGNIFICANT CHANGE UP (ref 3.8–10.5)
WBC # FLD AUTO: 4.88 K/UL — SIGNIFICANT CHANGE UP (ref 3.8–10.5)

## 2018-12-07 PROCEDURE — 93018 CV STRESS TEST I&R ONLY: CPT | Mod: GC

## 2018-12-07 PROCEDURE — 78452 HT MUSCLE IMAGE SPECT MULT: CPT | Mod: 26

## 2018-12-07 PROCEDURE — 93016 CV STRESS TEST SUPVJ ONLY: CPT | Mod: GC

## 2018-12-07 PROCEDURE — 93306 TTE W/DOPPLER COMPLETE: CPT | Mod: 26

## 2018-12-07 RX ORDER — ACETAMINOPHEN 500 MG
650 TABLET ORAL EVERY 6 HOURS
Qty: 0 | Refills: 0 | Status: DISCONTINUED | OUTPATIENT
Start: 2018-12-07 | End: 2018-12-08

## 2018-12-07 RX ORDER — ASPIRIN/CALCIUM CARB/MAGNESIUM 324 MG
1 TABLET ORAL
Qty: 0 | Refills: 0 | COMMUNITY
Start: 2018-12-07

## 2018-12-07 RX ADMIN — Medication 81 MILLIGRAM(S): at 13:45

## 2018-12-07 RX ADMIN — Medication 650 MILLIGRAM(S): at 13:45

## 2018-12-07 RX ADMIN — APIXABAN 2.5 MILLIGRAM(S): 2.5 TABLET, FILM COATED ORAL at 06:32

## 2018-12-07 RX ADMIN — Medication 650 MILLIGRAM(S): at 06:29

## 2018-12-07 RX ADMIN — Medication 12.5 MILLIGRAM(S): at 06:29

## 2018-12-07 NOTE — CONSULT NOTE ADULT - ASSESSMENT
68 y/o F with h/o a. fib on eliquis and metoprolol, CKD stage 5, anemia, HLD, HTN presents to the ED for nausea and epigastric pain

## 2018-12-07 NOTE — CONSULT NOTE ADULT - PROBLEM SELECTOR RECOMMENDATION 9
Patient follow with Dr. Perez nephrologist for CKD. unsure what caused CKD, was offered kidney biopsy but patient refused.   renal function stable  continue to monitor  avoid nephrotoxic agents

## 2018-12-07 NOTE — CONSULT NOTE ADULT - SUBJECTIVE AND OBJECTIVE BOX
[facility]   MD AIDEE RODRIGUEZ MD ANGELA WONG, PA    TEL:  OFFICE: [phone]  DR APONTE CELL: [phone]  DR. GOLDBERG CELL: [phone]  KANE BAILEY CELL: [phone]      HPI:  68 y/o F with h/o a. fib on eliquis and metoprolol, CKD stage 5, anemia, HLD, HTN presents to the ED for nausea and epigastric pain.Pt denies LOC, syncope, fever, chills, numbness, tingling, chest pain, shortness of breath.  Patient examined at bedside with daughter present. denied any complaints. Patient follow with Dr. Perez nephrologist for CKD. unsure what caused CKD, was offered kidney biopsy but patient refused. Patient is aware of kidney condition, has been recommended for kidney transplant, but no HD prep done yet.     Allergies:  PC Pen VK (Hives)      PAST MEDICAL & SURGICAL HISTORY:  Anemia  HTN (hypertension)  HLD (hyperlipidemia)  Chronic renal failure  Status post cataract extraction: Left eye 2017  Status post cataract extraction: right eye done 2017  Delivery by emergency caesarean section      Home Medications Reviewed    Hospital Medications:   MEDICATIONS  (STANDING):  apixaban 2.5 milliGRAM(s) Oral every 12 hours  aspirin  chewable 81 milliGRAM(s) Oral daily  atorvastatin 10 milliGRAM(s) Oral at bedtime  metoprolol tartrate 12.5 milliGRAM(s) Oral two times a day  pantoprazole    Tablet 40 milliGRAM(s) Oral before breakfast  sodium bicarbonate 650 milliGRAM(s) Oral three times a day      SOCIAL HISTORY:  Denies ETOh, Smoking,     FAMILY HISTORY:  Family history of hypertension (Sibling)  Family history of acute myocardial infarction (Sibling)  Family history of diabetes mellitus (Sibling)      REVIEW OF SYSTEMS:  CONSTITUTIONAL: No weakness, fevers or chills  EYES/ENT: No visual changes;  No vertigo or throat pain   NECK: No pain or stiffness  RESPIRATORY: No cough, wheezing, hemoptysis; No shortness of breath  CARDIOVASCULAR: No chest pain or palpitations.  GASTROINTESTINAL: No abdominal or epigastric pain. No nausea, vomiting, or hematemesis; No diarrhea or constipation. No melena or hematochezia.  GENITOURINARY: No dysuria, frequency, foamy urine, urinary urgency, incontinence or hematuria  NEUROLOGICAL: No numbness or weakness  SKIN: No itching, burning, rashes, or lesions   VASCULAR: No bilateral lower extremity edema.   All other review of systems is negative unless indicated above.    VITALS:  T(F): 97.6 (18 @ 11:13), Max: 97.6 (18 @ 00:57)  HR: 61 (18 @ 11:13)  BP: 151/83 (18 @ 11:13)  RR: 18 (18 @ 11:13)  SpO2: 100% (18 @ 11:13)  Wt(kg): --    Height (cm): 162.56 ( @ 23:27)  Weight (kg): 75.3 ( @ 23:27)  BMI (kg/m2): 28.5 ( @ 23:27)  BSA (m2): 1.81 ( @ 23:27)    PHYSICAL EXAM:  Constitutional: NAD  HEENT: anicteric sclera, oropharynx clear, MMM  Neck: No JVD  Respiratory: CTAB, no wheezes, rales or rhonchi  Cardiovascular: S1, S2, RRR  Gastrointestinal: BS+, soft, NT/ND  Extremities: No cyanosis or clubbing. No peripheral edema  Neurological: A/O x 3, no focal deficits  Psychiatric: Normal mood, normal affect  : No CVA tenderness. No meehan.   Skin: No rashes      LABS:      142  |  112<H>  |  26<H>  ----------------------------<  103<H>  5.0   |  18<L>  |  3.60<H>    Ca    8.7      07 Dec 2018 06:24  Phos  3.4       Mg     2.2         TPro  7.6  /  Alb  3.9  /  TBili  0.3  /  DBili      /  AST  11  /  ALT  7   /  AlkPhos  243<H>      Creatinine Trend: 3.60 <--, 3.71 <--                        8.8    4.88  )-----------( 229      ( 07 Dec 2018 06:24 )             29.2     Urine Studies:  Urinalysis Basic - ( 06 Dec 2018 17:45 )    Color: COLORLESS / Appearance: CLEAR / S.006 / pH: 6.5  Gluc: NEGATIVE / Ketone: NEGATIVE  / Bili: NEGATIVE / Urobili: NORMAL   Blood: NEGATIVE / Protein: NEGATIVE / Nitrite: NEGATIVE   Leuk Esterase: NEGATIVE / RBC:  / WBC    Sq Epi:  / Non Sq Epi:  / Bacteria:           RADIOLOGY & ADDITIONAL STUDIES:

## 2018-12-07 NOTE — DISCHARGE NOTE ADULT - ADDITIONAL INSTRUCTIONS
Follow up with your primary care doctor. Follow up with your Cardiologist Dr. Sheik Cervantes.  Follow up with your Nephrologist Dr. Ronald Perez.

## 2018-12-07 NOTE — DISCHARGE NOTE ADULT - PROVIDER TOKENS
TOKEN:'[id number]',FREE:[LAST:[Dr. Shaik Cervantes],PHONE:[phone]],FAX:[(   )    -],ADDRESS:[Cardiologist  [address] [place] [address]]]

## 2018-12-07 NOTE — DISCHARGE NOTE ADULT - HOSPITAL COURSE
68 y/o Female, with a PmHx a-fib on eliquis and metoprolol, CKD stage 4, anemia, HLD, HTN, presents to the ED for nausea and epigastric pain. Pt states she was at home this morning around 9 am and she was cooking and eating in the kitchen when she had a sudden onset of nausea and epigastric pain. Pt states she became dizzy and vomited once, NBNB . Pt then told her daughter take her to the ER. Pt states she is currently back to baseline. Pt denies LOC, syncope, fever, chills, numbness, tingling, chest pain, shortness of breath, palpitations, headache, visual/hearing changes, dysuria, urinary/bowel incontinence or any other complaints at this time. Admitted to telemetry for r/o acs.    On admission, EKG done showed NSR @ 60, T wave flattening in III, V1. hsTrop 25-->19-->19. Pt has CKD with kidney function currently around her baseline (26/3.71). CXR done showed clear lungs. Echo done showed mitral annular calcification, otherwise normal mitral valve. Minimal mitral regurgitation. Calcified trileaflet aortic valve with normal opening. Mild aortic regurgitation. Normal left ventricular internal dimensions and wall thicknesses. Normal left ventricular systolic function. No segmental wall motion abnormalities. Normal right ventricular size and function. Borderline pulm htn. Stress Test done showed normal study. The left ventricle was normal in size. Normal myocardial perfusion scan, with no evidence of infarction or inducible ischemia. LVEF > 70%, revealing normal LV function. Pt was seen and evaluated by Cardiology Dr. Christiansen. Pt comfortable at this time and is now medically cleared for discharge home as per Dr. Iniguez. Outpatient follow up with cardiology and nephrology.

## 2018-12-07 NOTE — DISCHARGE NOTE ADULT - PATIENT PORTAL LINK FT
You can access the [facility] Patient Portal, offered by [facility], by registering with the following website: [email or web address]

## 2018-12-07 NOTE — DISCHARGE NOTE ADULT - MEDICATION SUMMARY - MEDICATIONS TO TAKE
I will START or STAY ON the medications listed below when I get home from the hospital:    aspirin 81 mg oral tablet, chewable  -- 1 tab(s) by mouth once a day  -- Indication: For Need for prophylactic measure    sodium bicarbonate 650 mg oral tablet  -- 1 tab(s) by mouth 3 times a day  -- Indication: For CKD    apixaban 2.5 mg oral tablet  -- 1 tab(s) by mouth 2 times a day   -- Indication: For Atrial fibrillation    Lipitor 10 mg oral tablet  -- 1 tab(s) by mouth once a day  -- Indication: For HLD (hyperlipidemia)    metoprolol tartrate 25 mg oral tablet  -- 0.5 tab(s) by mouth 2 times a day   -- It is very important that you take or use this exactly as directed.  Do not skip doses or discontinue unless directed by your doctor.  May cause drowsiness.  Alcohol may intensify this effect.  Use care when operating dangerous machinery.  Some non-prescription drugs may aggravate your condition.  Read all labels carefully.  If a warning appears, check with your doctor before taking.  Take with food or milk.  This drug may impair the ability to drive or operate machinery.  Use care until you become familiar with its effects.    -- Indication: For Atrial fibrillation    omeprazole 20 mg oral delayed release tablet  -- 1 tab(s) by mouth once a day  -- Indication: For GERD    ergocalciferol 50,000 intl units (1.25 mg) oral capsule  -- 1 cap(s) by mouth once a week  -- Indication: For Supplement

## 2018-12-07 NOTE — DISCHARGE NOTE ADULT - CARE PLAN
Principal Discharge DX:	Dizziness  Goal:	Atypical symptoms. Now resolved  Assessment and plan of treatment:	Follow up with your primary care doctor.  Secondary Diagnosis:	Atrial fibrillation  Goal:	To restore or maintain a normal heart rate and rhythm, to prevent blood clots, and decrease the risks of stroke CVA/TIA.  Assessment and plan of treatment:	Please take your medications as prescribed.  Continue to take your blood thinner as prescribed and follow with your physician to monitor your levels.  Low fat diet, reduce caffeine intake, and exercise at least 30 minutes daily.  Secondary Diagnosis:	HLD (hyperlipidemia)  Goal:	To maintain normal cholesterol levels to prevent stroke, coronary artery disease, peripheral vascular disease and heart attacks.  Assessment and plan of treatment:	Low fat diet, exercise daily and continue current medications. Follow up with primary care physician and cardiologist for management.  Secondary Diagnosis:	HTN (hypertension)  Goal:	To maintain a normal blood pressure to prevent heart attack, stroke and renal failure.  Assessment and plan of treatment:	Low sodium and fat diet, continue anti-hypertensive medications, and follow up with primary care physician.  Secondary Diagnosis:	Chronic renal failure  Goal:	To prevent shortness of breath, fluid overload, and electrolytes imbalance, and to slow down worsening kidney disease.  Assessment and plan of treatment:	Continue blood pressure, cholesterol and diabetic medications. Goal of hemoglobin A1C (HgbA1C) < 7%.  Avoid nephrotoxic drugs such as nonsteroidal anti-inflammatory agents (NSAIDs).   Please follow up with your nephrologist to monitor your kidney function, continue with low protein and potassium diet.  Secondary Diagnosis:	Anemia  Goal:	To treat the underlying cause and restore a normal red blood cells level, to improve  the amount of oxygen that your blood can carry by increasing your hemoglobin and hematocrit level, and to prevent signs and symptoms such as shortness of breath, dizziness, weakness, congestive heart failure and death.  Assessment and plan of treatment:	Continue to take current medications as prescribed.  Follow up your routine physician's appointments.  If you notice any bleeding, please notify your doctor immediately or go to the nearest emergency room.

## 2018-12-07 NOTE — DISCHARGE NOTE ADULT - CARE PROVIDER_API CALL
Ronald Perez), Internal Medicine; Nephrology  [address] [place] [address]  Phone: [phone]  Fax: [phone]    Dr. Shaik Cervantes,   Cardiologist  [address] [place] [address]  Phone: [phone]  Fax: (       -

## 2018-12-07 NOTE — DISCHARGE NOTE ADULT - PLAN OF CARE
Atypical symptoms. Now resolved Follow up with your primary care doctor. To restore or maintain a normal heart rate and rhythm, to prevent blood clots, and decrease the risks of stroke CVA/TIA. Please take your medications as prescribed.  Continue to take your blood thinner as prescribed and follow with your physician to monitor your levels.  Low fat diet, reduce caffeine intake, and exercise at least 30 minutes daily. To maintain normal cholesterol levels to prevent stroke, coronary artery disease, peripheral vascular disease and heart attacks. Low fat diet, exercise daily and continue current medications. Follow up with primary care physician and cardiologist for management. To maintain a normal blood pressure to prevent heart attack, stroke and renal failure. Low sodium and fat diet, continue anti-hypertensive medications, and follow up with primary care physician. To prevent shortness of breath, fluid overload, and electrolytes imbalance, and to slow down worsening kidney disease. Continue blood pressure, cholesterol and diabetic medications. Goal of hemoglobin A1C (HgbA1C) < 7%.  Avoid nephrotoxic drugs such as nonsteroidal anti-inflammatory agents (NSAIDs).   Please follow up with your nephrologist to monitor your kidney function, continue with low protein and potassium diet. To treat the underlying cause and restore a normal red blood cells level, to improve  the amount of oxygen that your blood can carry by increasing your hemoglobin and hematocrit level, and to prevent signs and symptoms such as shortness of breath, dizziness, weakness, congestive heart failure and death. Continue to take current medications as prescribed.  Follow up your routine physician's appointments.  If you notice any bleeding, please notify your doctor immediately or go to the nearest emergency room.

## 2018-12-28 ENCOUNTER — APPOINTMENT (OUTPATIENT)
Dept: ORTHOPEDIC SURGERY | Facility: CLINIC | Age: 68
End: 2018-12-28
Payer: COMMERCIAL

## 2018-12-28 VITALS — WEIGHT: 166 LBS | BODY MASS INDEX: 28.34 KG/M2 | HEIGHT: 64 IN

## 2018-12-28 DIAGNOSIS — M17.10 UNILATERAL PRIMARY OSTEOARTHRITIS, UNSPECIFIED KNEE: ICD-10-CM

## 2018-12-28 PROCEDURE — 99213 OFFICE O/P EST LOW 20 MIN: CPT

## 2018-12-28 PROCEDURE — 73564 X-RAY EXAM KNEE 4 OR MORE: CPT | Mod: RT

## 2018-12-31 PROBLEM — M17.10 PRIMARY LOCALIZED OSTEOARTHROSIS OF THE KNEE: Status: ACTIVE | Noted: 2018-12-28

## 2019-01-31 ENCOUNTER — APPOINTMENT (OUTPATIENT)
Dept: ORTHOPEDIC SURGERY | Facility: CLINIC | Age: 69
End: 2019-01-31

## 2019-02-05 NOTE — ASU DISCHARGE PLAN (ADULT/PEDIATRIC). - DO NOT DRIVE OR OPERATE MACHINERY
Statement Selected
[FreeTextEntry1] : Allergic Rhinitis : Claritin daily at bedtime \par                             Nasal spray twice per day \par Right shoulder pain : Follow up Xray\par                                  Continue Ibuprofen every 6 hours, ice \par HTN: measure blood pressure at home or pharmacy, record reading and bring them on your next visit\par                           Follow up 2 weeks \par Ear impaction: Make an appointment for irrigation, use OTC debrox \par Prevnar vaccine discussed with patient. \par \par We spent sufficient time to discuss aspects of care; questions were answered  to patient's satisfaction.The diagnosis and care plan were discussed with patient in detail. All questions were answered.\par \par This assessment was perform by Rosmery Gracia FNP with Lottie Bowman supervising FNP.\par

## 2019-05-29 ENCOUNTER — TRANSCRIPTION ENCOUNTER (OUTPATIENT)
Age: 69
End: 2019-05-29

## 2019-05-30 ENCOUNTER — APPOINTMENT (OUTPATIENT)
Dept: OPHTHALMOLOGY | Facility: CLINIC | Age: 69
End: 2019-05-30
Payer: COMMERCIAL

## 2019-05-30 DIAGNOSIS — H57.11 OCULAR PAIN, RIGHT EYE: ICD-10-CM

## 2019-05-30 PROCEDURE — 92014 COMPRE OPH EXAM EST PT 1/>: CPT

## 2019-12-17 NOTE — DISCHARGE NOTE ADULT - FUNCTIONAL SCREEN CURRENT LEVEL: BATHING, MLM
HPI:   Nicole Kraus is a 80year old female who was seen by me on December 16, 2019 for her Medicare annual physical examination. At the time of examination Mr. Candis Carlos was seen with her daughter. Patient currently feels well.   She does have a probl mg by mouth daily. • Glucose Blood (CHINMAY CONTOUR NEXT TEST VI) TEST BLOOD SUGAR THREE TIMES DAILY AS DIRECTED  99   • aspirin 81 MG Oral Chew Tab Chew 1 tablet (81 mg total) by mouth daily.  90 tablet 0   • Blood Gluc Meter Disp-Strips Does not apply D ELECTROCARDIOGRAM, COMPLETE      Scanned to media tab - DOS 12-   • HEART CORONARY ARTERY BYPASS GRAFT N/A 11/11/2016    Performed by Carly Cline MD at [address] [facility] MAIN OR   • KNEE REPLACEMENT SURGERY Right 2008   • LUMPECTOMY RIGHT     • RADIATION auscultation  CARDIO: RRR, normal S1S2, no murmurs  GI: Protuberant, BS are present, no masses or organomegaly  MUSCULOSKELETAL: back is not tender, no pain or swelling in her legs  EXTREMITIES: no edema, all pulses are intact  NEURO;  Alert and oriented, C 9.2.  I would prefer the patient's blood sugars run high rather than low as I do not want her to have any hypoglycemic reactions. However when I try to get the hemoglobin A1c down to below 8 if possible.   Patient will increase her Lantus insulin 12 units creatinine 1.01 and a GFR of 49. CPM.    - BASIC METABOLIC PANEL (8); Future    15. Malignant neoplasm of right female breast, unspecified estrogen receptor status, unspecified site of breast ([address]  Stable. CPM.    16. Abnormal EKG  Stable.   Stefano Drone have 3 or more medical conditions?: 1-Yes    Have you fallen in the last 12 months?: 0-No         Do you have difficulty seeing?: 0-No    Do you have any difficulty walking or getting up?: 0-No    Do you have any tripping hazards?: 0-No    Are you on multi No   Family members and friends have told me they think I may have hearing loss:  No           Visual Acuity     Right Eye Visual Acuity: Uncorrected Left Eye Visual Acuity: Uncorrected   Right Eye Chart Acuity: 20/50 Left Eye Chart Acuity: 20/50     Cogni display for this patient. Update Health Maintenance if applicable    Chlamydia  Annually if high risk No results found for: CHLAMYDIA No flowsheet data found.     Screening Mammogram      Mammogram  Annually There are no preventive care reminders to display this or any previous visit. No flowsheet data found. (2) assistive person

## 2020-01-15 ENCOUNTER — EMERGENCY (EMERGENCY)
Facility: HOSPITAL | Age: 70
LOS: 1 days | Discharge: HOME | End: 2020-01-15
Attending: EMERGENCY MEDICINE | Admitting: EMERGENCY MEDICINE
Payer: COMMERCIAL

## 2020-01-15 VITALS
DIASTOLIC BLOOD PRESSURE: 75 MMHG | TEMPERATURE: 98 F | SYSTOLIC BLOOD PRESSURE: 114 MMHG | OXYGEN SATURATION: 100 % | HEART RATE: 73 BPM | RESPIRATION RATE: 16 BRPM

## 2020-01-15 VITALS
OXYGEN SATURATION: 100 % | RESPIRATION RATE: 15 BRPM | TEMPERATURE: 98 F | SYSTOLIC BLOOD PRESSURE: 148 MMHG | DIASTOLIC BLOOD PRESSURE: 83 MMHG | HEART RATE: 69 BPM

## 2020-01-15 DIAGNOSIS — Z98.49 CATARACT EXTRACTION STATUS, UNSPECIFIED EYE: Chronic | ICD-10-CM

## 2020-01-15 LAB
ALBUMIN SERPL ELPH-MCNC: 3.9 G/DL — SIGNIFICANT CHANGE UP (ref 3.3–5)
ALP SERPL-CCNC: 170 U/L — HIGH (ref 40–120)
ALT FLD-CCNC: 10 U/L — SIGNIFICANT CHANGE UP (ref 4–33)
ANION GAP SERPL CALC-SCNC: 10 MMO/L — SIGNIFICANT CHANGE UP (ref 7–14)
ANISOCYTOSIS BLD QL: SLIGHT — SIGNIFICANT CHANGE UP
AST SERPL-CCNC: 13 U/L — SIGNIFICANT CHANGE UP (ref 4–32)
BASOPHILS # BLD AUTO: 0.01 K/UL — SIGNIFICANT CHANGE UP (ref 0–0.2)
BASOPHILS NFR BLD AUTO: 0.2 % — SIGNIFICANT CHANGE UP (ref 0–2)
BASOPHILS NFR SPEC: 0.9 % — SIGNIFICANT CHANGE UP (ref 0–2)
BILIRUB SERPL-MCNC: 0.2 MG/DL — SIGNIFICANT CHANGE UP (ref 0.2–1.2)
BLASTS # FLD: 0 % — SIGNIFICANT CHANGE UP (ref 0–0)
BUN SERPL-MCNC: 34 MG/DL — HIGH (ref 7–23)
CALCIUM SERPL-MCNC: 8.3 MG/DL — LOW (ref 8.4–10.5)
CHLORIDE SERPL-SCNC: 110 MMOL/L — HIGH (ref 98–107)
CO2 SERPL-SCNC: 17 MMOL/L — LOW (ref 22–31)
CREAT SERPL-MCNC: 4.07 MG/DL — HIGH (ref 0.5–1.3)
EOSINOPHIL # BLD AUTO: 0.03 K/UL — SIGNIFICANT CHANGE UP (ref 0–0.5)
EOSINOPHIL NFR BLD AUTO: 0.6 % — SIGNIFICANT CHANGE UP (ref 0–6)
EOSINOPHIL NFR FLD: 0.9 % — SIGNIFICANT CHANGE UP (ref 0–6)
FLU A RESULT: NOT DETECTED — SIGNIFICANT CHANGE UP
FLU A RESULT: NOT DETECTED — SIGNIFICANT CHANGE UP
FLUAV AG NPH QL: NOT DETECTED — SIGNIFICANT CHANGE UP
FLUBV AG NPH QL: DETECTED — HIGH
GIANT PLATELETS BLD QL SMEAR: PRESENT — SIGNIFICANT CHANGE UP
GLUCOSE SERPL-MCNC: 103 MG/DL — HIGH (ref 70–99)
HCT VFR BLD CALC: 31.5 % — LOW (ref 34.5–45)
HGB BLD-MCNC: 9.6 G/DL — LOW (ref 11.5–15.5)
IMM GRANULOCYTES NFR BLD AUTO: 0.4 % — SIGNIFICANT CHANGE UP (ref 0–1.5)
LYMPHOCYTES # BLD AUTO: 1.87 K/UL — SIGNIFICANT CHANGE UP (ref 1–3.3)
LYMPHOCYTES # BLD AUTO: 36.2 % — SIGNIFICANT CHANGE UP (ref 13–44)
LYMPHOCYTES NFR SPEC AUTO: 17.5 % — SIGNIFICANT CHANGE UP (ref 13–44)
MCHC RBC-ENTMCNC: 26.1 PG — LOW (ref 27–34)
MCHC RBC-ENTMCNC: 30.5 % — LOW (ref 32–36)
MCV RBC AUTO: 85.6 FL — SIGNIFICANT CHANGE UP (ref 80–100)
METAMYELOCYTES # FLD: 0 % — SIGNIFICANT CHANGE UP (ref 0–1)
MICROCYTES BLD QL: SLIGHT — SIGNIFICANT CHANGE UP
MONOCYTES # BLD AUTO: 0.55 K/UL — SIGNIFICANT CHANGE UP (ref 0–0.9)
MONOCYTES NFR BLD AUTO: 10.7 % — SIGNIFICANT CHANGE UP (ref 2–14)
MONOCYTES NFR BLD: 7.9 % — SIGNIFICANT CHANGE UP (ref 2–9)
MYELOCYTES NFR BLD: 0 % — SIGNIFICANT CHANGE UP (ref 0–0)
NEUTROPHIL AB SER-ACNC: 64 % — SIGNIFICANT CHANGE UP (ref 43–77)
NEUTROPHILS # BLD AUTO: 2.68 K/UL — SIGNIFICANT CHANGE UP (ref 1.8–7.4)
NEUTROPHILS NFR BLD AUTO: 51.9 % — SIGNIFICANT CHANGE UP (ref 43–77)
NEUTS BAND # BLD: 4.4 % — SIGNIFICANT CHANGE UP (ref 0–6)
NRBC # FLD: 0 K/UL — SIGNIFICANT CHANGE UP (ref 0–0)
OTHER - HEMATOLOGY %: 0 — SIGNIFICANT CHANGE UP
OVALOCYTES BLD QL SMEAR: SLIGHT — SIGNIFICANT CHANGE UP
PLATELET # BLD AUTO: 209 K/UL — SIGNIFICANT CHANGE UP (ref 150–400)
PLATELET COUNT - ESTIMATE: NORMAL — SIGNIFICANT CHANGE UP
PMV BLD: 10.2 FL — SIGNIFICANT CHANGE UP (ref 7–13)
POIKILOCYTOSIS BLD QL AUTO: SLIGHT — SIGNIFICANT CHANGE UP
POLYCHROMASIA BLD QL SMEAR: SIGNIFICANT CHANGE UP
POTASSIUM SERPL-MCNC: 4.7 MMOL/L — SIGNIFICANT CHANGE UP (ref 3.5–5.3)
POTASSIUM SERPL-SCNC: 4.7 MMOL/L — SIGNIFICANT CHANGE UP (ref 3.5–5.3)
PROMYELOCYTES # FLD: 0 % — SIGNIFICANT CHANGE UP (ref 0–0)
PROT SERPL-MCNC: 7.6 G/DL — SIGNIFICANT CHANGE UP (ref 6–8.3)
RBC # BLD: 3.68 M/UL — LOW (ref 3.8–5.2)
RBC # FLD: 14 % — SIGNIFICANT CHANGE UP (ref 10.3–14.5)
REVIEW TO FOLLOW: YES — SIGNIFICANT CHANGE UP
RSV RESULT: SIGNIFICANT CHANGE UP
RSV RNA RESP QL NAA+PROBE: SIGNIFICANT CHANGE UP
SODIUM SERPL-SCNC: 137 MMOL/L — SIGNIFICANT CHANGE UP (ref 135–145)
VARIANT LYMPHS # BLD: 4.4 % — SIGNIFICANT CHANGE UP
WBC # BLD: 5.16 K/UL — SIGNIFICANT CHANGE UP (ref 3.8–10.5)
WBC # FLD AUTO: 5.16 K/UL — SIGNIFICANT CHANGE UP (ref 3.8–10.5)

## 2020-01-15 PROCEDURE — 99283 EMERGENCY DEPT VISIT LOW MDM: CPT

## 2020-01-15 PROCEDURE — 71046 X-RAY EXAM CHEST 2 VIEWS: CPT | Mod: 26

## 2020-01-15 RX ORDER — SODIUM CHLORIDE 9 MG/ML
500 INJECTION INTRAMUSCULAR; INTRAVENOUS; SUBCUTANEOUS ONCE
Refills: 0 | Status: COMPLETED | OUTPATIENT
Start: 2020-01-15 | End: 2020-01-15

## 2020-01-15 RX ADMIN — SODIUM CHLORIDE 250 MILLILITER(S): 9 INJECTION INTRAMUSCULAR; INTRAVENOUS; SUBCUTANEOUS at 14:31

## 2020-01-15 RX ADMIN — SODIUM CHLORIDE 500 MILLILITER(S): 9 INJECTION INTRAMUSCULAR; INTRAVENOUS; SUBCUTANEOUS at 12:57

## 2020-01-15 NOTE — ED PROVIDER NOTE - RAPID ASSESSMENT
69 year old female with chronic renal failure, hyperlipoidemia, and hypertension presents to ED with fever, chills, decreased PO, fatigue, and cough onset three days ago. Episodes of NBNB post-tussive emesis. As per daughter the patient has been intermittently coughing up scant amounts of yellow-brown sputum. The patient has a creatine of 6.3 and GFR of 17-18. Flu exposure from daughter. The patient denies rash,  diarrhea, recent travel, or any other medical problems. IUTD. The patient has a penicillin allergy.    On PE patient is non-toxic but appears unwell. Lungs clear, abd soft, and no edema. Likely viral syndrome. Will check labs, chest x-ray, and flu swab that patient requests. Full assessment to follow by EDT.

## 2020-01-15 NOTE — ED PROVIDER NOTE - NSFOLLOWUPINSTRUCTIONS_ED_ALL_ED_FT
Rest.  Drink plenty of fluids.    Follow up with your doctor this week.  Return to ER immediately for worsening symptoms or further concern.

## 2020-01-15 NOTE — ED PROVIDER NOTE - OBJECTIVE STATEMENT
Attending - 59 y/o F speaks Rohan.  Daughter at bedside assisting with history and translation.  Offered  phone.  c/o fever, productive cough, chills, myalgias, generalized weakness/tiredeness, and decreased PO intake x 3days.  Denies n/v/d.  No abd pain.  No CP/SOB.  Daughter previously had similar symptoms and diagnosed with influenza.  noted PMH including CRF, Afib on Eliquis.

## 2020-01-15 NOTE — ED PROVIDER NOTE - CLINICAL SUMMARY MEDICAL DECISION MAKING FREE TEXT BOX
61 y/o F with fever, cough, myalgias, decreased PO intake.  Consider PNA.  Likely viral syndrome, possibly influenza. Check CXR and labs.  Gentle IV hydration given CRF.  Re-eval.

## 2020-01-15 NOTE — ED PROVIDER NOTE - PMH
Anemia    Atrial fibrillation    Chronic renal failure    HLD (hyperlipidemia)    HTN (hypertension)

## 2020-01-15 NOTE — ED ADULT NURSE NOTE - OBJECTIVE STATEMENT
patient Alert & ox3. patient complains of flu like symptoms,fever,chills,cough.pt . evaluated by MD. Placed 20g angiocath Lt. ac, labs drawn and sent. patient will be waiting for results, further evaluation and disposition.  made comfortable.will continue to monitor.

## 2020-01-15 NOTE — ED PROVIDER NOTE - PHYSICAL EXAMINATION
ATTENDING PHYSICAL EXAM  GEN - Lying on stretcher, no resp distress  HEAD - NC/AT; EYES/NOSE - Pupils irreg b/l (s/p cataract surgery), EOMI, mucous membranes moist, no discharge; THROAT: Oral cavity and pharynx normal. No inflammation, swelling, exudate, or lesions  NECK: Neck supple, non-tender without lymphadenopathy, no masses, no JVD  PULMONARY - CTA b/l with few scattered rhonchi  CARDIAC -s1s2, RRR, no M,R,G  ABDOMEN - +BS, ND, NT, soft, no guarding, no rebound, no masses   BACK - no CVA tenderness, No vertebral or paravertebral tenderness  EXTREMITIES - symmetric pulses, 2+ dp, capillary refill < 2 seconds, no clubbing, no cyanosis, no edema  SKIN - no rash or bruising

## 2020-01-15 NOTE — ED PROVIDER NOTE - PATIENT PORTAL LINK FT
You can access the FollowMyHealth Patient Portal offered by [facility] by registering at the following website: [email or web address]. By joining [employer]’s FollowMyHealth portal, you will also be able to view your health information using other applications (apps) compatible with our system.

## 2020-01-15 NOTE — ED PROVIDER NOTE - PROGRESS NOTE DETAILS
Patient re-assessed.  Feels better.  Labs and CXR reviewed.  Lungs clear.  Will give another 500mL bolus NS and encourage PO trial. Patient re-assessed.  Tolerating PO.  Discussed with daughter (who is PA) risks and potential benefits of tamiflu - agree that should not give.  Stable for discharge.  Discussed indications for RTER.  Discussed case with Dr. Christiansen on phone as well.

## 2020-09-24 NOTE — ASU PATIENT PROFILE, ADULT - NS PRO PT RIGHT SUPPORT PERSON
[address] [facility], [facility] [address] ? [place], [address] ? Phone [phone] ? Fax [phone]           Return to Work/School Status    Patient: Payton Washington  YOB: 2004   Date: 09/24/2020      [facility] has adopted CDCs time-based return to work/school strategy for persons with confirmed or suspected COVID19. [facility] does not recommend using a test-based strategy for returning to work/school after COVID-19 infection. [facility] has reported prolonged positive test results without evidence of infectiousness. At this time, positive specimens capable of producing disease have not been isolated more than 9 days after onset of illness.   Symptomatic persons with confirmed COVID-19 or suspected COVID-19 can return to work/school after:    At least 3 days (72 hours) have passed since recovery defined as resolution of fever without the use of fever-reducing medications AND improvement in respiratory symptoms (e.g., cough, shortness of breath)    AND, at least 10 days have passed since first positive test  Asymptomatic persons with confirmed COVID-19 can return to work after:   At least 10 days have passed since the positive laboratory test and the person remains asymptomatic.  More information about the science behind the symptom-based return to work/school can be found at: https://www.cdc.gov/coronavirus/2019-ncov/community/sxeopguo-qirhluhjmoz-ghidpuskb.html    Sincerely,    Sumanth Jimenez MD         same name as above

## 2021-01-13 NOTE — ED PROVIDER NOTE - PR
wnl [employer] Counseling:  I discussed with the patient the risks of mycophenolate mofetil including but not limited to infection/immunosuppression, GI upset, hypokalemia, hypercholesterolemia, bone marrow suppression, lymphoproliferative disorders, malignancy, GI ulceration/bleed/perforation, colitis, interstitial lung disease, kidney failure, progressive multifocal leukoencephalopathy, and birth defects.  The patient understands that monitoring is required including a baseline creatinine and regular CBC testing. In addition, patient must alert us immediately if symptoms of infection or other concerning signs are noted.

## 2021-03-19 PROBLEM — I48.91 UNSPECIFIED ATRIAL FIBRILLATION: Chronic | Status: ACTIVE | Noted: 2020-01-15

## 2021-04-09 ENCOUNTER — APPOINTMENT (OUTPATIENT)
Dept: DISASTER EMERGENCY | Facility: OTHER | Age: 71
End: 2021-04-09
Payer: COMMERCIAL

## 2021-04-09 PROCEDURE — 0002A: CPT

## 2021-05-25 NOTE — PROGRESS NOTE ADULT - PROBLEM/PLAN-1
[facility]    Brief Operative Note    Pre-operative diagnosis: Cellulitis of left lower extremity [L03.116]  Necrosis of toe (H) [I96]  Post-operative diagnosis Same as pre-operative diagnosis    Procedure: Procedure(s):  IRRIGATION AND DEBRIDEMENT OF LEFT FOOT,  4th TOE AMPUTATION  Surgeon: Surgeon(s) and Role:     * Kari Rehman MD - Primary     * Jony Bear 
DISPLAY PLAN FREE TEXT
DISPLAY PLAN FREE TEXT

## 2021-07-29 NOTE — ED ADULT NURSE NOTE - NS ED NURSE DISCH DISPOSITION
GLYCEMIC CONTROL PLAN OF CARE        Diabetes Management:      Assessment: known h/o T2DM, HbA1C not within recommended range for age + comorbids basal/bolus/oral home regimen  admitted for acute CHF exacerbation    Recommend: FBG trending down, consistent corrective coverage, pt might benefit from conservative dose of mealtime insulin   *Humalog 2 units qac    BG in target range (non-ICU\" < 180 ; -180):  [] Yes  [x] No      Steroids:no    Current Insulin regimen: - Humalog Normal Insulin Sensitivity Corrective Coverage    TDD previous day = 12 units - Humalog Normal Insulin Sensitivity Corrective Coverage    Most recent blood glucose results:   Lab Results   Component Value Date/Time    GLUCPOC 154 (H) 07/29/2021 06:28 AM    GLUCPOC 137 (H) 07/28/2021 09:20 PM    GLUCPOC 318 (H) 07/28/2021 05:16 PM         Hypo: no    HbA1C: equivalent  to ave BGlucose of 177 mg/dl for 2-3 months prior to admission  Lab Results   Component Value Date/Time    Hemoglobin A1c 7.9 (H) 07/21/2021 03:00 AM     Adequate glycemic control PTA:  [] Yes  [x] No      Home diabetes medications:  Key Antihyperglycemic Medications             insulin glargine (LANTUS) 100 unit/mL injection (Taking) 26 Units by SubCUTAneous route nightly. insulin lispro (HUMALOG) 100 unit/mL injection (Taking) Use above sliding scale, start at 150-199 with 3 units if needed    metFORMIN (GLUCOPHAGE) 500 mg tablet (Taking) Take 500 mg by mouth two (2) times daily (with meals). Indications: type 2 diabetes mellitus, pt taking differently once a day        Goals:  Blood glucose will be within target range of 70 - 180 mg/dL by: 8/15    Diet:   Active Orders   Diet    ADULT DIET Regular; 4 carb choices (60 gm/meal);  Low Sodium (2 gm); 1200 ml       Patient Vitals for the past 100 hrs:   % Diet Eaten   07/28/21 1720 76 - 100%   07/27/21 2040 76 - 100%   07/27/21 1920 26 - 50%   07/27/21 1204 76 - 100%   07/26/21 1910 26 - 50%   07/25/21 1749 76 - 100% 07/25/21 1139 76 - 100%        Education:  _____ Refer to Diabetes Education Record                       __X___ Education not indicated at this time    Julian Wadsworth MS, RN, CDE  Glycemic Control Team  [phone]  Pager [phone] (M-TH 8:00-4:30P)  *After Hours pager [phone] Discharged

## 2022-12-16 NOTE — PATIENT PROFILE ADULT. - NS PRO OT REFERRAL QUES 1 YN
It was a pleasure taking care of you at [facility] Emergency Department today. We know that when you come to [facility], you are entrusting us with your health, comfort, and safety. Our physicians and nurses honor that trust, and we truly appreciate the opportunity to care for you and your loved ones. We also value our feedback. If you receive a survey about your Emergency Department experience today, please fill it out. We care about our patients' feedback, and we listen to what you have to say. Thank you! no

## 2023-06-01 NOTE — DISCHARGE NOTE ADULT - ADMISSION DATE +STARTOFVISITDATE
Statement Selected distress, uncomfortable and in pain. Vital signs: (most recent): Blood pressure 132/75, pulse 54, height 5' 6.5\" (1.689 m), weight 167 lb 3.2 oz (75.8 kg), SpO2 95 %, not currently breastfeeding. Vital signs are normal.  No fever. Output: Producing urine and producing stool. HEENT: Normal HEENT exam.    Lungs:  Normal effort and normal respiratory rate. She is not in respiratory distress. Heart: Normal rate. Extremities: Normal range of motion. There is no deformity. Neurological: Patient is alert and oriented to person, place and time. Patient has normal coordination. Pupils:  Pupils are equal, round, and reactive to light. Pupils are equal.   Skin:  Warm and dry. No rash or cyanosis. Assessment & Plan    EXAMINATION:  5 XRAY VIEWS OF THE LUMBAR SPINE     4/15/2023 3:49 pm       FINDINGS:  Vertebral body heights and alignment are maintained. Pedicles are intact. There is no convincing evidence of acute fracture. SI joints appear  symmetric and slightly narrowed bilaterally. Multilevel osteophytes with  slight loss of disc space height L2-L3. Very mild facet arthropathy lower  lumbar spine. Somewhat limited motion without dynamic instability on  flexion/extension views. EXAMINATION:  MRI OF THE LUMBAR SPINE WITHOUT CONTRAST, 5/25/2023 12:50 pm       L3-L4: There is no significant disc herniation, spinal canal stenosis or  neural foraminal narrowing. L4-L5: There is facet hypertrophy without significant stenosis. L5-S1: There is facet hypertrophy, without significant stenosis. IMPRESSION:  Lower lumbar facet hypertrophy, without significant stenosis. 1. Spondylosis of lumbosacral spine without myelopathy    2.  Vertebrogenic low back pain        MRI lumbar spine  Report was reviewed  Images reviewed independently  Finding discussed in detail with patient  Inferior endplate changes with Modic changes involving L3 endplate  Lower facet arthropathy at

## 2023-08-21 NOTE — ED PROVIDER NOTE - OBJECTIVE STATEMENT
2 hrs pta patient was at home cooking / eating in the kitchen when she had onset of nausea and epigastric pain, Dr. Pryor Dr. Pryor Hx a-fib on eliquis and metoprolol, CKD stage 4 that states 2 hrs pta patient was at home cooking / eating in the kitchen when she had onset of nausea and epigastric pain, became dizzy, vomited once, and then asked her daughter to take her to the ER. Notes that she had this in the past and was told that it was caused by a-fib, but that she is now on metoprolol and isn't sure if this is exactly the same. Has no hx of gallbladder disease or GERD. No hx of cardiac disease other than her a-fib. Does have HLD on atorvastatin 10mg qd. Did not take her eliquis / metoprolol yet today. No blood in vomit, still nauseated, no SOB, no dizziness now. Last creatinine ~3.3. Hx a-fib on eliquis and metoprolol, CKD stage 4 that states 2 hrs pta patient was at home cooking / eating in the kitchen when she had onset of nausea and epigastric pain, became dizzy, vomited once, and then asked her daughter to take her to the ER. Notes that she had this in the past and was told that it was caused by a-fib, but that she is now on metoprolol and isn't sure if this is exactly the same. Has no hx of gallbladder disease or GERD. No hx of cardiac disease other than her a-fib. Does have HLD on atorvastatin 10mg qd. Did not take her eliquis / metoprolol yet today. No blood in vomit, still nauseated, no SOB, no dizziness now. Last creatinine ~3.6.

## 2023-12-18 NOTE — DISCHARGE NOTE ADULT - PROVIDER TOKENS
Referral placed, authorized status. FREE:[LAST:[Fermín],FIRST:[Neetu],PHONE:[phone]],FAX:[(   )    -]]

## 2024-01-22 NOTE — PHYSICAL THERAPY INITIAL EVALUATION ADULT - SHORT TERM MEMORY, REHAB EVAL
Please find out if they called his neurologist. Advised to start there first last week. Radha   intact
